# Patient Record
Sex: MALE | Race: WHITE
[De-identification: names, ages, dates, MRNs, and addresses within clinical notes are randomized per-mention and may not be internally consistent; named-entity substitution may affect disease eponyms.]

---

## 2018-05-28 ENCOUNTER — HOSPITAL ENCOUNTER (OUTPATIENT)
Dept: HOSPITAL 88 - ER | Age: 48
Setting detail: OBSERVATION
LOS: 2 days | Discharge: HOME | End: 2018-05-30
Attending: INTERNAL MEDICINE | Admitting: INTERNAL MEDICINE
Payer: COMMERCIAL

## 2018-05-28 VITALS — HEIGHT: 75 IN | WEIGHT: 261.01 LBS | BODY MASS INDEX: 32.45 KG/M2

## 2018-05-28 VITALS — DIASTOLIC BLOOD PRESSURE: 92 MMHG | SYSTOLIC BLOOD PRESSURE: 136 MMHG

## 2018-05-28 VITALS — SYSTOLIC BLOOD PRESSURE: 156 MMHG | DIASTOLIC BLOOD PRESSURE: 97 MMHG

## 2018-05-28 VITALS — DIASTOLIC BLOOD PRESSURE: 87 MMHG | SYSTOLIC BLOOD PRESSURE: 139 MMHG

## 2018-05-28 VITALS — DIASTOLIC BLOOD PRESSURE: 97 MMHG | SYSTOLIC BLOOD PRESSURE: 156 MMHG

## 2018-05-28 VITALS — DIASTOLIC BLOOD PRESSURE: 99 MMHG | SYSTOLIC BLOOD PRESSURE: 156 MMHG

## 2018-05-28 DIAGNOSIS — C79.31: ICD-10-CM

## 2018-05-28 DIAGNOSIS — R51: ICD-10-CM

## 2018-05-28 DIAGNOSIS — E86.0: Primary | ICD-10-CM

## 2018-05-28 DIAGNOSIS — E11.9: ICD-10-CM

## 2018-05-28 DIAGNOSIS — J32.9: ICD-10-CM

## 2018-05-28 DIAGNOSIS — R11.2: ICD-10-CM

## 2018-05-28 DIAGNOSIS — C31.9: ICD-10-CM

## 2018-05-28 LAB
ALBUMIN SERPL-MCNC: 3.8 G/DL (ref 3.5–5)
ALBUMIN/GLOB SERPL: 1 {RATIO} (ref 0.8–2)
ALP SERPL-CCNC: 79 IU/L (ref 40–150)
ALT SERPL-CCNC: 34 IU/L (ref 0–55)
ANION GAP SERPL CALC-SCNC: 21.7 MMOL/L (ref 8–16)
BACTERIA URNS QL MICRO: (no result) /HPF
BASOPHILS # BLD AUTO: 0.1 10*3/UL (ref 0–0.1)
BASOPHILS NFR BLD AUTO: 0.5 % (ref 0–1)
BILIRUB UR QL: (no result)
BUN SERPL-MCNC: 10 MG/DL (ref 7–26)
BUN/CREAT SERPL: 10 (ref 6–25)
CALCIUM SERPL-MCNC: 9.9 MG/DL (ref 8.4–10.2)
CHLORIDE SERPL-SCNC: 96 MMOL/L (ref 98–107)
CLARITY UR: CLEAR
CO2 SERPL-SCNC: 20 MMOL/L (ref 22–29)
COLOR UR: YELLOW
DEPRECATED NEUTROPHILS # BLD AUTO: 6 10*3/UL (ref 2.1–6.9)
DEPRECATED RBC URNS MANUAL-ACNC: (no result) /HPF (ref 0–5)
EGFRCR SERPLBLD CKD-EPI 2021: > 60 ML/MIN (ref 60–?)
EOSINOPHIL # BLD AUTO: 0.1 10*3/UL (ref 0–0.4)
EOSINOPHIL NFR BLD AUTO: 0.5 % (ref 0–6)
EPI CELLS URNS QL MICRO: (no result) /LPF
ERYTHROCYTE [DISTWIDTH] IN CORD BLOOD: 13.1 % (ref 11.7–14.4)
GLOBULIN PLAS-MCNC: 3.8 G/DL (ref 2.3–3.5)
GLUCOSE SERPLBLD-MCNC: 176 MG/DL (ref 74–118)
HCT VFR BLD AUTO: 41.8 % (ref 38.2–49.6)
HGB BLD-MCNC: 15.4 G/DL (ref 14–18)
KETONES UR QL STRIP.AUTO: (no result)
LEUKOCYTE ESTERASE UR QL STRIP.AUTO: NEGATIVE
LYMPHOCYTES # BLD: 1.4 10*3/UL (ref 1–3.2)
LYMPHOCYTES NFR BLD AUTO: 15 % (ref 18–39.1)
MCH RBC QN AUTO: 35.4 PG (ref 28–32)
MCHC RBC AUTO-ENTMCNC: 36.8 G/DL (ref 31–35)
MCV RBC AUTO: 96.1 FL (ref 81–99)
MONOCYTES # BLD AUTO: 1.8 10*3/UL (ref 0.2–0.8)
MONOCYTES NFR BLD AUTO: 19.1 % (ref 4.4–11.3)
NEUTS SEG NFR BLD AUTO: 63.6 % (ref 38.7–80)
NITRITE UR QL STRIP.AUTO: NEGATIVE
PLATELET # BLD AUTO: 231 X10E3/UL (ref 140–360)
POTASSIUM SERPL-SCNC: 3.7 MMOL/L (ref 3.5–5.1)
PROT UR QL STRIP.AUTO: NEGATIVE
RBC # BLD AUTO: 4.35 X10E6/UL (ref 4.3–5.7)
SODIUM SERPL-SCNC: 134 MMOL/L (ref 136–145)
SP GR UR STRIP: 1.02 (ref 1.01–1.02)
UROBILINOGEN UR STRIP-MCNC: 0.2 MG/DL (ref 0.2–1)
WBC #/AREA URNS HPF: (no result) /HPF (ref 0–5)

## 2018-05-28 PROCEDURE — 83735 ASSAY OF MAGNESIUM: CPT

## 2018-05-28 PROCEDURE — 80053 COMPREHEN METABOLIC PANEL: CPT

## 2018-05-28 PROCEDURE — 82948 REAGENT STRIP/BLOOD GLUCOSE: CPT

## 2018-05-28 PROCEDURE — 96374 THER/PROPH/DIAG INJ IV PUSH: CPT

## 2018-05-28 PROCEDURE — 84443 ASSAY THYROID STIM HORMONE: CPT

## 2018-05-28 PROCEDURE — 36415 COLL VENOUS BLD VENIPUNCTURE: CPT

## 2018-05-28 PROCEDURE — 80048 BASIC METABOLIC PNL TOTAL CA: CPT

## 2018-05-28 PROCEDURE — 84439 ASSAY OF FREE THYROXINE: CPT

## 2018-05-28 PROCEDURE — 85025 COMPLETE CBC W/AUTO DIFF WBC: CPT

## 2018-05-28 PROCEDURE — 99284 EMERGENCY DEPT VISIT MOD MDM: CPT

## 2018-05-28 RX ADMIN — DEXAMETHASONE SODIUM PHOSPHATE SCH MG: 4 INJECTION, SOLUTION INTRAMUSCULAR; INTRAVENOUS at 14:30

## 2018-05-28 RX ADMIN — INSULIN HUMAN SCH UNIT: 100 INJECTION, SOLUTION PARENTERAL at 18:10

## 2018-05-28 RX ADMIN — INSULIN HUMAN SCH UNIT: 100 INJECTION, SOLUTION PARENTERAL at 12:01

## 2018-05-28 RX ADMIN — INSULIN HUMAN SCH UNIT: 100 INJECTION, SOLUTION PARENTERAL at 07:59

## 2018-05-28 RX ADMIN — DEXAMETHASONE SODIUM PHOSPHATE SCH MG: 4 INJECTION, SOLUTION INTRAMUSCULAR; INTRAVENOUS at 22:26

## 2018-05-28 RX ADMIN — FAMOTIDINE SCH MG: 20 TABLET, FILM COATED ORAL at 17:48

## 2018-05-28 RX ADMIN — SODIUM CHLORIDE SCH MLS/HR: 9 INJECTION, SOLUTION INTRAVENOUS at 06:17

## 2018-05-28 RX ADMIN — HYDROCODONE BITARTRATE AND ACETAMINOPHEN PRN EA: 7.5; 325 TABLET ORAL at 18:11

## 2018-05-28 RX ADMIN — INSULIN HUMAN SCH UNIT: 100 INJECTION, SOLUTION PARENTERAL at 21:00

## 2018-05-28 RX ADMIN — METOCLOPRAMIDE SCH MG: 10 TABLET ORAL at 09:00

## 2018-05-28 RX ADMIN — HYDROCODONE BITARTRATE AND ACETAMINOPHEN PRN EA: 7.5; 325 TABLET ORAL at 11:04

## 2018-05-28 RX ADMIN — TAZOBACTAM SODIUM AND PIPERACILLIN SODIUM SCH MLS/HR: 375; 3 INJECTION, SOLUTION INTRAVENOUS at 14:30

## 2018-05-28 RX ADMIN — METOCLOPRAMIDE SCH MG: 10 TABLET ORAL at 10:57

## 2018-05-28 RX ADMIN — SODIUM CHLORIDE SCH MLS/HR: 9 INJECTION, SOLUTION INTRAVENOUS at 12:06

## 2018-05-28 RX ADMIN — TAZOBACTAM SODIUM AND PIPERACILLIN SODIUM SCH MLS/HR: 375; 3 INJECTION, SOLUTION INTRAVENOUS at 20:30

## 2018-05-28 RX ADMIN — METOCLOPRAMIDE SCH MG: 10 TABLET ORAL at 20:30

## 2018-05-28 RX ADMIN — HYDROCODONE BITARTRATE AND ACETAMINOPHEN PRN EA: 7.5; 325 TABLET ORAL at 22:26

## 2018-05-28 RX ADMIN — DEXAMETHASONE SODIUM PHOSPHATE SCH MG: 4 INJECTION, SOLUTION INTRAMUSCULAR; INTRAVENOUS at 06:17

## 2018-05-28 RX ADMIN — METOCLOPRAMIDE SCH MG: 10 TABLET ORAL at 17:48

## 2018-05-28 NOTE — HISTORY AND PHYSICAL
PRIMARY CARE PROVIDER:  Dr. Titi Wynne, following him for sinus cancer.



CHIEF COMPLAINT:  Recalcitrant nausea, vomiting and dehydration.



HISTORY OF PRESENT ILLNESS:  Mr. Byrd is a 47-year-old gentleman who had 

his last chemo for sinus cancer about 2 weeks ago.  He has had recalcitrant 

nausea and vomiting for 10 days and unable to keep anything down, becoming 

dehydrated.



REVIEW OF SYSTEMS:  He denies fever, chills or weight loss.  He has sinus 

congestion.  He denies sore throat.  He denies chest pain or palpitations.  

He denies shortness of breath, wheezing or cough.  He denies abdominal 

pain, but he does have nausea and vomiting.  He denies diarrhea, melena, 

hematemesis or hematochezia.  He denies dysuria or flank pain.  He denies 

rash or pruritus.  He denies bleeding or bruising.  He has a headache.  He 

denies vertigo.  He denies syncope or focal weakness.  He denies 

depression, agitation, homicidal or suicidal ideation.



PAST MEDICAL HISTORY:  Significant for longstanding, type-2 diabetes.  He 

apparently does not take any medication for that.  Sinus cancer diagnosed 

in 2013.  He had extensive resection at that time followed by radiation and 

chemotherapy and was in remission for a while.  It has recurred in the last 

year.  He now has stage 4 with local recurrence as well as what appears to 

be a new brain met.  



MEDICATIONS:  He lists no home medications.  



SURGICAL HISTORY:  Aside from the sinus surgery, many years ago he had 

orthopedic surgery on both ankles for fracture.  



ALLERGIES:  HE HAS NO KNOWN DRUG ALLERGIES.



FAMILY HISTORY:  Unremarkable.



SOCIAL HISTORY:  The patient is .  English is his primary 

language.  He does not smoke, drink or use illegal drugs.  He is generally 

independently functioning.



PHYSICAL EXAMINATION 

PSYCHIATRIC:  He is alert and oriented times 3 with normal mood and affect. 



CONSTITUTIONAL:  He has a normal body habitus.  He is in no acute distress. 



VITAL SIGNS:  Blood pressure 139/87.  Pulse 100 and regular.  Respiratory 

rate 16.  O2 sat 94%.  Temperature 96.4.

HEENT:  Head is atraumatic.  His eyes are anicteric.  His oropharynx is 

clear. 

NECK:  Supple with no mass or thyromegaly. 

LYMPHATIC SYSTEM:  He has no palpable cervical, axillary or inguinal 

adenopathy. 

CARDIOVASCULAR:  His heart has regular rate and rhythm without murmur or 

extra heart sound.  He has no carotid bruit.  He has no peripheral edema.  

He has palpable dorsal pedal pulses.  

RESPIRATORY:  Lungs are clear to auscultation and percussion with normal 

respiratory effort. 

GASTROINTESTINAL:  Abdomen is soft without organomegaly, masses or 

tenderness.  He has normal bowel sounds present. 

CUTANEOUS:  His skin is warm and dry to touch.  No rash or skin breakdown. 

MUSCULOSKELETAL:  His joints are in normal alignment without erythema or 

swelling.  He has no calf tenderness.  

NEUROLOGIC:  Exam is nonfocal.  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  

He is otherwise nonfocal with intact cranial nerves and no motor or sensory 

deficits. 



DIAGNOSTIC STUDIES:  MRI scan of the brain shows extensive changes in the 

sinuses consistent with old surgery, possible recurrent tumor, cannot rule 

out sinus infection.  Also shows a right temporal lobe metastasis with some 

surrounding vasogenic edema.  His UA is clear.  Chemistry shows normal 

electrolytes.  CO2 is 20.  Creatinine 1.02 and BUN 10 for a normal GFR.  

Glucose is 176.  Calcium 9.9.  His transaminases, bilirubin and alk phos 

are normal.  CBC shows a white count 9.37 with a normal differential.  

Hemoglobin 15.4, hematocrit 41.8 and platelet count 231,000.



IMPRESSION AND PLAN

1. Recalcitrant nausea and vomiting with dehydration.  The patient has 

received about 3 liters of IV fluids.  He is starting to feel better.  

The patient was placed on Pepcid and Reglan.  His nausea has improved, 

and he is started on a clear liquid diet.  Will advance as tolerated.

2. Sinus cancer, stage IV.  The patient is being followed by oncology.  

They have been consulted.  Will defer to them. 

3. What appears to be a new brain metastasis.  The patient has been 

started on dexamethasone 4 mg q.8 h. and, again, oncology has been 

consulted.

4. Sinusitis.  The patient has been started on IV Zosyn empirically.

5. Type-2 diabetes.  The patient has been started on Levemir and sliding 

scale insulin.

6. For prophylaxis, the patient will be on Pepcid for GI prophylaxis and 

SCDs for DVT prophylaxis.  No anticoagulation due to the brain met.

 







DD:  05/28/2018 14:17

DT:  05/28/2018 14:56

Job#:  [id number]

## 2018-05-28 NOTE — DIAGNOSTIC IMAGING REPORT
Exam: Brain MRI without and with IV contrast

History: Evaluate for metastases response to chemotherapy, sinus cancer

Comparison studies: None



Technique:

Precontrast sagittal and axial T2 FS, axial T1 FLAIR and axial T2*GRE. Post

contrast axial T2 FLAIR and axial, coronal and sagittal T1 FS.

Intravenous contrast: 20 cc MultiHance.



Findings:



Per reported history, patient is with history of sinus cancer. The patient

appears to have undergone previous endoscopic sinonasal surgery with bilateral

medial antrostomies, partial ethmoidectomies bilateral middle turbinate

reduction possibly bilateral sphenoid sinusotomies. There is reactive

nonspecific inflammatory mucosal thickening throughout the ethmoids, maxillary

sinuses and sphenoid sinuses with bilateral maxillary sinus retention cysts and

secretions throughout the ethmoids and sphenoid sinuses. There is enhancing

soft tissue which presumably reflects tumor within the ethmoid cavity extend to

the sphenoid cavity, along the cribriform plate, planum sphenoidale and sella

which presumably reflects tumor. Enhancing tissue/presumed tumor extends into

the cavernous sinuses bilaterally and into the sella. The pituitary

infundibulum is also thickened. Soft tissue local extends into the extraconal

space and orbit posteriorly on the left where it infiltrates the extraocular

muscles near the orbital apex and encases the left orbital nerve at the orbital

apex and within its intracanalicular segment. The left intracanalicular and

intracranial optic nerve is enhancing which may be reactive due to left optic

neuropathy. Please note, cannot adequately differentiate tumor from infection

in this context.



A 2.2 x 1.9 x 2.5 cm (SI x AP x TV) enhancing lesion with central arthrosis in

the right temporal lobe is with moderate surrounding edema. Mass effect remains

local with only mild effacement on the temporal horn of the right lateral

ventricle. There are additional similar smaller enhancing lesions with

surrounding vasogenic edema along the bilateral gyri recti and olfactory gyri

of the anterior inferior frontal lobes. The largest of these lesions measures

0.9 x 1.7 x 0.6 cm (SI x AP x TV) along the right gyrus rectus.  These lesions

are without associated restricted diffusion. Though these lesions may reflect

direct extension of metastatic disease through the right cavernous

sinus/sinonasal cavity and anterior cranial fossa respectively, infection with

nonpyrogenic abscesses and cerebritis related to superimposed sinusitis cannot

be differentiated.



There is reactive dural enhancement along the medial temporal convexities along

the cavernous sinuses as well as along the left inferior frontal and

anterolateral left temporal convexity. Enhancement within the left sphenoid

wing may reflect metastasis in the clinical setting of sinonasal cancer.

Enhancement in the basisphenoid of the clivus with associated retroclival dural

enhancement may also reflect metastatic disease, though again difficult to

differentiate from infection.



No acute ischemia. A nonspecific 3 focus of increased susceptibility/decreased

signal on T2*GRE sequence in the right putamen may reflect dystrophic

mineralization or possibly small hemorrhage.



The right temporal horn is partially effaced as described above. The remaining

lateral, third and fourth ventricles are mildly dilated without evidence of

acute hydrocephalus.



Suprasellar region: No abnormalities.

Craniocervical junction: Patent foramen magnum.  No Chiari one malformation.

Vessels: Normal flow-voids in the arteries and sinuses.

Incidental findings: Nonspecific reactive T2 hyperintense changes in the

mastoids.



IMPRESSION:  



1.  Changes of sinonasal surgery with nonspecific reactive changes throughout

the paranasal sinuses and ill-defined enhancing sinonasal mass/presumed tumor

which infiltrates the floor of the anterior cranial fossa, left orbit, central

skull base, sella, pituitary infundibulum and bilateral cavernous sinuses as

described. Moreover, sinonasal/skull base infection cannot be differentiated

from tumor in this context.



2.  Enhancing lesions in the right temporal lobe and anterior inferior frontal

lobes with surrounding vasogenic edema may reflect metastatic disease.

Moreover, superimposed infection with nonpyogenic abscesses, meningitis and

cerebritis could have this appearance. Mass effect remains local. No

herniation.



3.  Mild ventriculomegaly. No acute hydrocephalus.





Clinical correlation is recommended. Comparison with any prior available

outside imaging may also be helpful.



Signed by: Dr. Misael Lima M.D. on 5/28/2018 11:56 AM

## 2018-05-29 VITALS — SYSTOLIC BLOOD PRESSURE: 159 MMHG | DIASTOLIC BLOOD PRESSURE: 60 MMHG

## 2018-05-29 VITALS — SYSTOLIC BLOOD PRESSURE: 144 MMHG | DIASTOLIC BLOOD PRESSURE: 89 MMHG

## 2018-05-29 VITALS — SYSTOLIC BLOOD PRESSURE: 131 MMHG | DIASTOLIC BLOOD PRESSURE: 97 MMHG

## 2018-05-29 VITALS — DIASTOLIC BLOOD PRESSURE: 86 MMHG | SYSTOLIC BLOOD PRESSURE: 133 MMHG

## 2018-05-29 VITALS — DIASTOLIC BLOOD PRESSURE: 85 MMHG | SYSTOLIC BLOOD PRESSURE: 126 MMHG

## 2018-05-29 VITALS — SYSTOLIC BLOOD PRESSURE: 124 MMHG | DIASTOLIC BLOOD PRESSURE: 85 MMHG

## 2018-05-29 VITALS — SYSTOLIC BLOOD PRESSURE: 146 MMHG | DIASTOLIC BLOOD PRESSURE: 96 MMHG

## 2018-05-29 LAB
ALBUMIN SERPL-MCNC: 3.6 G/DL (ref 3.5–5)
ALBUMIN/GLOB SERPL: 1.1 {RATIO} (ref 0.8–2)
ALP SERPL-CCNC: 66 IU/L (ref 40–150)
ALT SERPL-CCNC: 24 IU/L (ref 0–55)
ANION GAP SERPL CALC-SCNC: 14 MMOL/L (ref 8–16)
BASOPHILS # BLD AUTO: 0 10*3/UL (ref 0–0.1)
BASOPHILS NFR BLD AUTO: 0.3 % (ref 0–1)
BUN SERPL-MCNC: 11 MG/DL (ref 7–26)
BUN/CREAT SERPL: 10 (ref 6–25)
CALCIUM SERPL-MCNC: 9.6 MG/DL (ref 8.4–10.2)
CHLORIDE SERPL-SCNC: 104 MMOL/L (ref 98–107)
CO2 SERPL-SCNC: 23 MMOL/L (ref 22–29)
DEPRECATED NEUTROPHILS # BLD AUTO: 6.9 10*3/UL (ref 2.1–6.9)
EGFRCR SERPLBLD CKD-EPI 2021: > 60 ML/MIN (ref 60–?)
EOSINOPHIL # BLD AUTO: 0 10*3/UL (ref 0–0.4)
EOSINOPHIL NFR BLD AUTO: 0 % (ref 0–6)
ERYTHROCYTE [DISTWIDTH] IN CORD BLOOD: 13 % (ref 11.7–14.4)
GLOBULIN PLAS-MCNC: 3.3 G/DL (ref 2.3–3.5)
GLUCOSE SERPLBLD-MCNC: 218 MG/DL (ref 74–118)
HCT VFR BLD AUTO: 36.1 % (ref 38.2–49.6)
HGB BLD-MCNC: 13.3 G/DL (ref 14–18)
LYMPHOCYTES # BLD: 0.3 10*3/UL (ref 1–3.2)
LYMPHOCYTES NFR BLD AUTO: 4.2 % (ref 18–39.1)
MCH RBC QN AUTO: 35.6 PG (ref 28–32)
MCHC RBC AUTO-ENTMCNC: 36.8 G/DL (ref 31–35)
MCV RBC AUTO: 96.5 FL (ref 81–99)
MONOCYTES # BLD AUTO: 0.6 10*3/UL (ref 0.2–0.8)
MONOCYTES NFR BLD AUTO: 8.1 % (ref 4.4–11.3)
NEUTS SEG NFR BLD AUTO: 86.9 % (ref 38.7–80)
PLATELET # BLD AUTO: 201 X10E3/UL (ref 140–360)
POTASSIUM SERPL-SCNC: 4 MMOL/L (ref 3.5–5.1)
RBC # BLD AUTO: 3.74 X10E6/UL (ref 4.3–5.7)
SODIUM SERPL-SCNC: 137 MMOL/L (ref 136–145)
TSH SERPL DL<=0.005 MIU/L-ACNC: 0 UIU/ML (ref 0.35–4.94)

## 2018-05-29 RX ADMIN — DEXAMETHASONE SODIUM PHOSPHATE SCH MG: 4 INJECTION, SOLUTION INTRAMUSCULAR; INTRAVENOUS at 21:07

## 2018-05-29 RX ADMIN — INSULIN HUMAN SCH UNIT: 100 INJECTION, SOLUTION PARENTERAL at 11:30

## 2018-05-29 RX ADMIN — TAZOBACTAM SODIUM AND PIPERACILLIN SODIUM SCH MLS/HR: 375; 3 INJECTION, SOLUTION INTRAVENOUS at 20:53

## 2018-05-29 RX ADMIN — METOCLOPRAMIDE SCH MG: 10 TABLET ORAL at 08:12

## 2018-05-29 RX ADMIN — DEXAMETHASONE SODIUM PHOSPHATE SCH MG: 4 INJECTION, SOLUTION INTRAMUSCULAR; INTRAVENOUS at 13:33

## 2018-05-29 RX ADMIN — TAZOBACTAM SODIUM AND PIPERACILLIN SODIUM SCH MLS/HR: 375; 3 INJECTION, SOLUTION INTRAVENOUS at 02:16

## 2018-05-29 RX ADMIN — METOCLOPRAMIDE SCH MG: 10 TABLET ORAL at 12:15

## 2018-05-29 RX ADMIN — TAZOBACTAM SODIUM AND PIPERACILLIN SODIUM SCH MLS/HR: 375; 3 INJECTION, SOLUTION INTRAVENOUS at 08:12

## 2018-05-29 RX ADMIN — DEXAMETHASONE SODIUM PHOSPHATE SCH MG: 4 INJECTION, SOLUTION INTRAMUSCULAR; INTRAVENOUS at 05:24

## 2018-05-29 RX ADMIN — INSULIN HUMAN SCH UNIT: 100 INJECTION, SOLUTION PARENTERAL at 07:30

## 2018-05-29 RX ADMIN — METOCLOPRAMIDE SCH MG: 10 TABLET ORAL at 15:44

## 2018-05-29 RX ADMIN — FAMOTIDINE SCH MG: 20 TABLET, FILM COATED ORAL at 15:43

## 2018-05-29 RX ADMIN — INSULIN HUMAN SCH UNIT: 100 INJECTION, SOLUTION PARENTERAL at 16:30

## 2018-05-29 RX ADMIN — TAZOBACTAM SODIUM AND PIPERACILLIN SODIUM SCH MLS/HR: 375; 3 INJECTION, SOLUTION INTRAVENOUS at 13:33

## 2018-05-29 RX ADMIN — METOCLOPRAMIDE SCH MG: 10 TABLET ORAL at 20:53

## 2018-05-29 RX ADMIN — INSULIN HUMAN SCH UNIT: 100 INJECTION, SOLUTION PARENTERAL at 20:56

## 2018-05-29 RX ADMIN — FAMOTIDINE SCH MG: 20 TABLET, FILM COATED ORAL at 08:12

## 2018-05-29 RX ADMIN — HYDROCODONE BITARTRATE AND ACETAMINOPHEN PRN EA: 7.5; 325 TABLET ORAL at 14:14

## 2018-05-30 VITALS — SYSTOLIC BLOOD PRESSURE: 136 MMHG | DIASTOLIC BLOOD PRESSURE: 85 MMHG

## 2018-05-30 VITALS — SYSTOLIC BLOOD PRESSURE: 133 MMHG | DIASTOLIC BLOOD PRESSURE: 83 MMHG

## 2018-05-30 VITALS — DIASTOLIC BLOOD PRESSURE: 83 MMHG | SYSTOLIC BLOOD PRESSURE: 133 MMHG

## 2018-05-30 VITALS — DIASTOLIC BLOOD PRESSURE: 88 MMHG | SYSTOLIC BLOOD PRESSURE: 136 MMHG

## 2018-05-30 LAB
ANION GAP SERPL CALC-SCNC: 13.1 MMOL/L (ref 8–16)
BASOPHILS # BLD AUTO: 0 10*3/UL (ref 0–0.1)
BASOPHILS NFR BLD AUTO: 0.1 % (ref 0–1)
BUN SERPL-MCNC: 15 MG/DL (ref 7–26)
BUN/CREAT SERPL: 13 (ref 6–25)
CALCIUM SERPL-MCNC: 9.8 MG/DL (ref 8.4–10.2)
CHLORIDE SERPL-SCNC: 103 MMOL/L (ref 98–107)
CO2 SERPL-SCNC: 25 MMOL/L (ref 22–29)
DEPRECATED NEUTROPHILS # BLD AUTO: 11.9 10*3/UL (ref 2.1–6.9)
EGFRCR SERPLBLD CKD-EPI 2021: > 60 ML/MIN (ref 60–?)
EOSINOPHIL # BLD AUTO: 0 10*3/UL (ref 0–0.4)
EOSINOPHIL NFR BLD AUTO: 0 % (ref 0–6)
ERYTHROCYTE [DISTWIDTH] IN CORD BLOOD: 13.2 % (ref 11.7–14.4)
GLUCOSE SERPLBLD-MCNC: 142 MG/DL (ref 74–118)
HCT VFR BLD AUTO: 33.3 % (ref 38.2–49.6)
HGB BLD-MCNC: 12.3 G/DL (ref 14–18)
LYMPHOCYTES # BLD: 0.5 10*3/UL (ref 1–3.2)
LYMPHOCYTES NFR BLD AUTO: 3.6 % (ref 18–39.1)
MAGNESIUM SERPL-MCNC: 1.9 MG/DL (ref 1.3–2.1)
MCH RBC QN AUTO: 35.8 PG (ref 28–32)
MCHC RBC AUTO-ENTMCNC: 36.9 G/DL (ref 31–35)
MCV RBC AUTO: 96.8 FL (ref 81–99)
MONOCYTES # BLD AUTO: 0.9 10*3/UL (ref 0.2–0.8)
MONOCYTES NFR BLD AUTO: 6.5 % (ref 4.4–11.3)
NEUTS SEG NFR BLD AUTO: 88.9 % (ref 38.7–80)
PLATELET # BLD AUTO: 198 X10E3/UL (ref 140–360)
POTASSIUM SERPL-SCNC: 4.1 MMOL/L (ref 3.5–5.1)
RBC # BLD AUTO: 3.44 X10E6/UL (ref 4.3–5.7)
SODIUM SERPL-SCNC: 137 MMOL/L (ref 136–145)
T4 FREE SERPL-MCNC: 1.31 NG/DL (ref 0.9–1.8)

## 2018-05-30 RX ADMIN — METOCLOPRAMIDE SCH MG: 10 TABLET ORAL at 08:47

## 2018-05-30 RX ADMIN — FAMOTIDINE SCH MG: 20 TABLET, FILM COATED ORAL at 08:47

## 2018-05-30 RX ADMIN — TAZOBACTAM SODIUM AND PIPERACILLIN SODIUM SCH MLS/HR: 375; 3 INJECTION, SOLUTION INTRAVENOUS at 01:41

## 2018-05-30 RX ADMIN — TAZOBACTAM SODIUM AND PIPERACILLIN SODIUM SCH MLS/HR: 375; 3 INJECTION, SOLUTION INTRAVENOUS at 08:47

## 2018-05-30 RX ADMIN — HYDROCODONE BITARTRATE AND ACETAMINOPHEN PRN EA: 7.5; 325 TABLET ORAL at 02:45

## 2018-05-30 RX ADMIN — DEXAMETHASONE SODIUM PHOSPHATE SCH MG: 4 INJECTION, SOLUTION INTRAMUSCULAR; INTRAVENOUS at 05:43

## 2018-05-30 RX ADMIN — INSULIN HUMAN SCH UNIT: 100 INJECTION, SOLUTION PARENTERAL at 07:30

## 2018-05-30 RX ADMIN — HYDROCODONE BITARTRATE AND ACETAMINOPHEN PRN EA: 7.5; 325 TABLET ORAL at 08:54

## 2018-05-30 NOTE — DISCHARGE SUMMARY
ADMISSION DIAGNOSES 

1.  Recalcitrant nausea and vomiting with dehydration.

2.  Sinus cancer, stage IV.

3.  Possible new brain mets.

4.  Sinusitis.

5.  Type 2 diabetes.



DISCHARGE DIAGNOSES 

1.  Recalcitrant nausea and vomiting with dehydration.

2.  Sinus cancer, stage IV.

3.  Possible new brain mets.

4.  Sinusitis.

5.  Type 2 diabetes.

6.  Anemia.



HISTORY:  The patient has a history of hypertension, type 2 diabetes, sinus 

cancer diagnosed in 2013.  He had an extensive resection followed by 

radiation and then chemo, and was in remission for a while.  It has 

recurred in the last year or so.  He now has stage IV with local 

recurrence, as well as what appears to be a new brain mets.



HOSPITAL COURSE:  A 47-year-old male who had his chemo for sinus cancer 

about 2 weeks ago, now has nausea and vomiting for 10 days, and unable to 

keep anything down, which is causing him to become dehydrated.  The patient 

was started on Pepcid, Reglan and IV fluids.  He is tolerating a regular 

diet prior to discharge.  Oncology was consulted for sinus cancer and brain 

mets.  He was also started on dexamethasone 4 mg q.8 h.  He will continue 

those until he follows with oncology next Tuesday.  He was started on IV 

Zosyn empirically for sinusitis, but was discharged on Augmentin for 10 

more days.  He will continue his same diabetes medications.  



On admission, MRI of the brain showed enhancing lesions in the right 

temporal lobe and anterior inferior frontal lobes with surrounding 

vasogenic edema, which may reflect metastatic disease.  WBC on discharge is 

13.33 likely due to steroids.  The patient is afebrile.  Hemoglobin 12.3, 

hematocrit 33.3.  Sodium 137, potassium 4.1, creatinine 1.13, GFR of over 

30.  The patient is tolerating diet and ready to go home.  He will follow 

up with oncology on Tuesday and primary care as needed.  



DICTATED BY MARIANNA LEMOS NP



 



 _________________________________

MIKA CARLTON MD



DD:  05/30/2018 17:35

DT:  05/30/2018 18:15

Job#:  [id number]

## 2018-06-10 ENCOUNTER — HOSPITAL ENCOUNTER (EMERGENCY)
Dept: HOSPITAL 88 - ER | Age: 48
Discharge: HOME | End: 2018-06-10
Payer: COMMERCIAL

## 2018-06-10 VITALS — DIASTOLIC BLOOD PRESSURE: 78 MMHG | SYSTOLIC BLOOD PRESSURE: 126 MMHG

## 2018-06-10 VITALS — BODY MASS INDEX: 32.58 KG/M2 | WEIGHT: 262 LBS | HEIGHT: 75 IN

## 2018-06-10 DIAGNOSIS — E11.9: ICD-10-CM

## 2018-06-10 DIAGNOSIS — R22.0: ICD-10-CM

## 2018-06-10 DIAGNOSIS — I10: ICD-10-CM

## 2018-06-10 DIAGNOSIS — G43.909: Primary | ICD-10-CM

## 2018-06-10 DIAGNOSIS — C79.31: ICD-10-CM

## 2018-06-10 LAB
ANION GAP SERPL CALC-SCNC: 12.4 MMOL/L (ref 8–16)
BASOPHILS # BLD AUTO: 0 10*3/UL (ref 0–0.1)
BASOPHILS NFR BLD AUTO: 0.1 % (ref 0–1)
BUN SERPL-MCNC: 14 MG/DL (ref 7–26)
BUN/CREAT SERPL: 18 (ref 6–25)
CALCIUM SERPL-MCNC: 9 MG/DL (ref 8.4–10.2)
CHLORIDE SERPL-SCNC: 100 MMOL/L (ref 98–107)
CO2 SERPL-SCNC: 29 MMOL/L (ref 22–29)
DEPRECATED NEUTROPHILS # BLD AUTO: 5 10*3/UL (ref 2.1–6.9)
EGFRCR SERPLBLD CKD-EPI 2021: > 60 ML/MIN (ref 60–?)
EOSINOPHIL # BLD AUTO: 0.1 10*3/UL (ref 0–0.4)
EOSINOPHIL NFR BLD AUTO: 1.5 % (ref 0–6)
ERYTHROCYTE [DISTWIDTH] IN CORD BLOOD: 14.4 % (ref 11.7–14.4)
GLUCOSE SERPLBLD-MCNC: 167 MG/DL (ref 74–118)
HCT VFR BLD AUTO: 35.4 % (ref 38.2–49.6)
HGB BLD-MCNC: 12.4 G/DL (ref 14–18)
LYMPHOCYTES # BLD: 1.5 10*3/UL (ref 1–3.2)
LYMPHOCYTES NFR BLD AUTO: 21.5 % (ref 18–39.1)
MCH RBC QN AUTO: 35.6 PG (ref 28–32)
MCHC RBC AUTO-ENTMCNC: 35 G/DL (ref 31–35)
MCV RBC AUTO: 101.7 FL (ref 81–99)
MONOCYTES # BLD AUTO: 0.1 10*3/UL (ref 0.2–0.8)
MONOCYTES NFR BLD AUTO: 0.7 % (ref 4.4–11.3)
NEUTS SEG NFR BLD AUTO: 74.7 % (ref 38.7–80)
PLATELET # BLD AUTO: 94 X10E3/UL (ref 140–360)
POTASSIUM SERPL-SCNC: 4.4 MMOL/L (ref 3.5–5.1)
RBC # BLD AUTO: 3.48 X10E6/UL (ref 4.3–5.7)
SODIUM SERPL-SCNC: 137 MMOL/L (ref 136–145)

## 2018-06-10 PROCEDURE — 80048 BASIC METABOLIC PNL TOTAL CA: CPT

## 2018-06-10 PROCEDURE — 99283 EMERGENCY DEPT VISIT LOW MDM: CPT

## 2018-06-10 PROCEDURE — 36415 COLL VENOUS BLD VENIPUNCTURE: CPT

## 2018-06-10 PROCEDURE — 85025 COMPLETE CBC W/AUTO DIFF WBC: CPT

## 2018-06-10 NOTE — XMS REPORT
Patient Summary Document

 Created on: 06/10/2018



MATHEW MORAN

External Reference #: [id number]

: 1970

Sex: Male



Demographics







 Address  [address] [place] [address]

 

 Home Phone  [phone]

 

 Preferred Language  Unknown

 

 Marital Status  Unknown

 

 Judaism Affiliation  Unknown

 

 Race  Unknown

 

 Ethnic Group  Unknown





Author







 Author  [facility]

 

 Address  Unknown

 

 Phone  Unavailable







Care Team Providers







 Care Team Member Name  Role  Phone

 

 MIKA CARLTON  Unavailable  Unavailable







Problems

This patient has no known problems.



Allergies, Adverse Reactions, Alerts

This patient has no known allergies or adverse reactions.



Medications

This patient has no known medications.



Results







 Test Description  Test Time  Test Comments  Text Results  Atomic Results  
Result Comments









 MRI BRAIN WOW            [place] [address]      Patient Name: MATHEW MORAN 
  MR #: [id number]    : 1970 Age/Sex: 47/M  Acct #: [id number] Req #
: [id number]  Adm Physician: MIKA CARLTON MD    Ordered by: KATEY FONSECA MD  Report #: [id number]   Location: MED/SURG3  Room/Bed: [facility]    ____
________________________________________________________________________________
_______________    Procedure: [id number] MRI/MRI BRAIN WOW  Exam Date:          
                   Exam Time:        REPORT STATUS: Signed    Exam: Brain MRI 
without and with IV contrast   History: Evaluate for metastases response to 
chemotherapy, sinus cancer   Comparison studies: None      Technique:   
Precontrast sagittal and axial T2 FS, axial T1 FLAIR and axial T2*GRE. Post   
contrast axial T2 FLAIR and axial, coronal and sagittal T1 FS.   Intravenous 
contrast: 20 cc MultiHance.      Findings:      Per reported history, patient 
is with history of sinus cancer. The patient   appears to have undergone 
previous endoscopic sinonasal surgery with bilateral   medial antrostomies, 
partial ethmoidectomies bilateral middle turbinate   reduction possibly 
bilateral sphenoid sinusotomies. There is reactive   nonspecific inflammatory 
mucosal thickening throughout the ethmoids, maxillary   sinuses and sphenoid 
sinuses with bilateral maxillary sinus retention cysts and   secretions 
throughout the ethmoids and sphenoid sinuses. There is enhancing   soft tissue 
which presumably reflects tumor within the ethmoid cavity extend to   the 
sphenoid cavity, along the cribriform plate, planum sphenoidale and sella   
which presumably reflects tumor. Enhancing tissue/presumed tumor extends into   
the cavernous sinuses bilaterally and into the sella. The pituitary   
infundibulum is also thickened. Soft tissue local extends into the extraconal   
space and orbit posteriorly on the left where it infiltrates the extraocular   
muscles near the orbital apex and encases the left orbital nerve at the orbital
   apex and within its intracanalicular segment. The left intracanalicular and 
  intracranial optic nerve is enhancing which may be reactive due to left optic
   neuropathy. Please note, cannot adequately differentiate tumor from 
infection   in this context.      A 2.2 x 1.9 x 2.5 cm (SI x AP x TV) enhancing 
lesion with central arthrosis in   the right temporal lobe is with moderate 
surrounding edema. Mass effect remains   local with only mild effacement on the 
temporal horn of the right lateral   ventricle. There are additional similar 
smaller enhancing lesions with   surrounding vasogenic edema along the 
bilateral gyri recti and olfactory gyri   of the anterior inferior frontal 
lobes. The largest of these lesions measures   0.9 x 1.7 x 0.6 cm (SI x AP x TV
) along the right gyrus rectus.  These lesions   are without associated 
restricted diffusion. Though these lesions may reflect   direct extension of 
metastatic disease through the right cavernous   sinus/sinonasal cavity and 
anterior cranial fossa respectively, infection with   nonpyrogenic abscesses 
and cerebritis related to superimposed sinusitis cannot   be differentiated.   
   There is reactive dural enhancement along the medial temporal convexities 
along   the cavernous sinuses as well as along the left inferior frontal and   
anterolateral left temporal convexity. Enhancement within the left sphenoid   
wing may reflect metastasis in the clinical setting of sinonasal cancer.   
Enhancement in the basisphenoid of the clivus with associated retroclival dural
   enhancement may also reflect metastatic disease, though again difficult to   
differentiate from infection.      No acute ischemia. A nonspecific 3 focus of 
increased susceptibility/decreased   signal on T2*GRE sequence in the right 
putamen may reflect dystrophic   mineralization or possibly small hemorrhage.  
    The right temporal horn is partially effaced as described above. The 
remaining   lateral, third and fourth ventricles are mildly dilated without 
evidence of   acute hydrocephalus.      Suprasellar region: No abnormalities.   
Craniocervical junction: Patent foramen magnum.  No Chiari one malformation.   
Vessels: Normal flow-voids in the arteries and sinuses.   Incidental findings: 
Nonspecific reactive T2 hyperintense changes in the   mastoids.      IMPRESSION
:        1.  Changes of sinonasal surgery with nonspecific reactive changes 
throughout   the paranasal sinuses and ill-defined enhancing sinonasal mass/
presumed tumor   which infiltrates the floor of the anterior cranial fossa, 
left orbit, central   skull base, sella, pituitary infundibulum and bilateral 
cavernous sinuses as   described. Moreover, sinonasal/skull base infection 
cannot be differentiated   from tumor in this context.      2.  Enhancing 
lesions in the right temporal lobe and anterior inferior frontal   lobes with 
surrounding vasogenic edema may reflect metastatic disease.   Moreover, 
superimposed infection with nonpyogenic abscesses, meningitis and   cerebritis 
could have this appearance. Mass effect remains local. No   herniation.      3.
  Mild ventriculomegaly. No acute hydrocephalus.         Clinical correlation 
is recommended. Comparison with any prior available   outside imaging may also 
be helpful.      Signed by: Dr. Sam Lima M.D. on 2018 11:56 AM      
  Dictated By: SAM LIMA MD  Electronically Signed By: SAM LIMA MD 
on 18 1153  Transcribed By: HUA on 18 1154       COPY TO:   
KATEY FONSECA MD

## 2018-07-18 ENCOUNTER — HOSPITAL ENCOUNTER (EMERGENCY)
Dept: HOSPITAL 88 - ER | Age: 48
Discharge: HOME | End: 2018-07-18
Payer: COMMERCIAL

## 2018-07-18 VITALS — DIASTOLIC BLOOD PRESSURE: 92 MMHG | SYSTOLIC BLOOD PRESSURE: 111 MMHG

## 2018-07-18 VITALS — HEIGHT: 75 IN | BODY MASS INDEX: 34.32 KG/M2 | WEIGHT: 276 LBS

## 2018-07-18 DIAGNOSIS — R53.1: Primary | ICD-10-CM

## 2018-07-18 DIAGNOSIS — H53.9: ICD-10-CM

## 2018-07-18 DIAGNOSIS — H05.20: ICD-10-CM

## 2018-07-18 DIAGNOSIS — R11.2: ICD-10-CM

## 2018-07-18 DIAGNOSIS — C31.1: ICD-10-CM

## 2018-07-18 DIAGNOSIS — C79.31: ICD-10-CM

## 2018-07-18 LAB
ALBUMIN SERPL-MCNC: 3.8 G/DL (ref 3.5–5)
ALBUMIN/GLOB SERPL: 1.4 {RATIO} (ref 0.8–2)
ALP SERPL-CCNC: 98 IU/L (ref 40–150)
ALT SERPL-CCNC: 49 IU/L (ref 0–55)
ANION GAP SERPL CALC-SCNC: 16.9 MMOL/L (ref 8–16)
BACTERIA URNS QL MICRO: (no result) /HPF
BASOPHILS # BLD AUTO: 0.1 10*3/UL (ref 0–0.1)
BASOPHILS NFR BLD AUTO: 0.9 % (ref 0–1)
BILIRUB UR QL: NEGATIVE
BUN SERPL-MCNC: 9 MG/DL (ref 7–26)
BUN/CREAT SERPL: 11 (ref 6–25)
CALCIUM SERPL-MCNC: 9.6 MG/DL (ref 8.4–10.2)
CHLORIDE SERPL-SCNC: 101 MMOL/L (ref 98–107)
CLARITY UR: CLEAR
CO2 SERPL-SCNC: 27 MMOL/L (ref 22–29)
COLOR UR: YELLOW
DEPRECATED NEUTROPHILS # BLD AUTO: 8.1 10*3/UL (ref 2.1–6.9)
DEPRECATED PHOSPHATE SERPL-MCNC: 3.1 MG/DL (ref 2.3–4.7)
DEPRECATED RBC URNS MANUAL-ACNC: (no result) /HPF (ref 0–5)
EGFRCR SERPLBLD CKD-EPI 2021: > 60 ML/MIN (ref 60–?)
EOSINOPHIL # BLD AUTO: 0.1 10*3/UL (ref 0–0.4)
EOSINOPHIL NFR BLD AUTO: 0.7 % (ref 0–6)
EPI CELLS URNS QL MICRO: (no result) /LPF
ERYTHROCYTE [DISTWIDTH] IN CORD BLOOD: 15.7 % (ref 11.7–14.4)
GLOBULIN PLAS-MCNC: 2.7 G/DL (ref 2.3–3.5)
GLUCOSE SERPLBLD-MCNC: 170 MG/DL (ref 74–118)
HCT VFR BLD AUTO: 46.4 % (ref 38.2–49.6)
HGB BLD-MCNC: 16.5 G/DL (ref 14–18)
KETONES UR QL STRIP.AUTO: NEGATIVE
LEUKOCYTE ESTERASE UR QL STRIP.AUTO: NEGATIVE
LYMPHOCYTES # BLD: 1.3 10*3/UL (ref 1–3.2)
LYMPHOCYTES NFR BLD AUTO: 11.2 % (ref 18–39.1)
MAGNESIUM SERPL-MCNC: 1.6 MG/DL (ref 1.3–2.1)
MCH RBC QN AUTO: 35.1 PG (ref 28–32)
MCHC RBC AUTO-ENTMCNC: 35.6 G/DL (ref 31–35)
MCV RBC AUTO: 98.7 FL (ref 81–99)
MONOCYTES # BLD AUTO: 1.3 10*3/UL (ref 0.2–0.8)
MONOCYTES NFR BLD AUTO: 11.6 % (ref 4.4–11.3)
NEUTS SEG NFR BLD AUTO: 70.6 % (ref 38.7–80)
NITRITE UR QL STRIP.AUTO: NEGATIVE
PLATELET # BLD AUTO: 189 X10E3/UL (ref 140–360)
POTASSIUM SERPL-SCNC: 3.9 MMOL/L (ref 3.5–5.1)
PROT UR QL STRIP.AUTO: NEGATIVE
RBC # BLD AUTO: 4.7 X10E6/UL (ref 4.3–5.7)
SODIUM SERPL-SCNC: 141 MMOL/L (ref 136–145)
SP GR UR STRIP: 1 (ref 1.01–1.02)
TSH SERPL DL<=0.005 MIU/L-ACNC: 0 UIU/ML (ref 0.35–4.94)
UROBILINOGEN UR STRIP-MCNC: 1 MG/DL (ref 0.2–1)
WBC #/AREA URNS HPF: (no result) /HPF (ref 0–5)

## 2018-07-18 PROCEDURE — 36415 COLL VENOUS BLD VENIPUNCTURE: CPT

## 2018-07-18 PROCEDURE — 70488 CT MAXILLOFACIAL W/O & W/DYE: CPT

## 2018-07-18 PROCEDURE — 84100 ASSAY OF PHOSPHORUS: CPT

## 2018-07-18 PROCEDURE — 85025 COMPLETE CBC W/AUTO DIFF WBC: CPT

## 2018-07-18 PROCEDURE — 86850 RBC ANTIBODY SCREEN: CPT

## 2018-07-18 PROCEDURE — 83735 ASSAY OF MAGNESIUM: CPT

## 2018-07-18 PROCEDURE — 74022 RADEX COMPL AQT ABD SERIES: CPT

## 2018-07-18 PROCEDURE — 70470 CT HEAD/BRAIN W/O & W/DYE: CPT

## 2018-07-18 PROCEDURE — 84443 ASSAY THYROID STIM HORMONE: CPT

## 2018-07-18 PROCEDURE — 81001 URINALYSIS AUTO W/SCOPE: CPT

## 2018-07-18 PROCEDURE — 80053 COMPREHEN METABOLIC PANEL: CPT

## 2018-07-18 PROCEDURE — 86900 BLOOD TYPING SEROLOGIC ABO: CPT

## 2018-07-18 PROCEDURE — 99284 EMERGENCY DEPT VISIT MOD MDM: CPT

## 2018-07-18 NOTE — DIAGNOSTIC IMAGING REPORT
******** ADDENDUM #1 ********



Examination: Head CT without and with contrast.



Technique: Axial images through the head were performed without and with

intravenous contrast.

Intravenous contrast: 100 mL of Isovue 370



Signed by: Dr. Laura Garcia M.D. on 7/18/2018 11:40 AM

******** ORIGINAL REPORT ********



EXAMINATION: Head CT 



HISTORY: Severe headache with vomiting, history of sinus/brain cancer, left

facial swelling, worsening weakness for the last week

COMPARISON: Brain MRI of 5/28/2018

TECHNIQUE: Multidetector axial images were obtained without contrast from the

foramen magnum to the vertex . The images were reconstructed using brain and

bone algorithms.  Thin section brain images were reformatted into coronal and

sagittal planes.

Intravenous contrast: None. 

Image quality: Motion/streaking artifact limits the evaluation of the skull

base and posterior cranial fossa.



FINDINGS:



     Parenchyma: 

1.  Interval decrease in size, enhancement and surrounding vasogenic edema of

previously seen enhancing lesions in the bilateral medial orbito-frontal gyri

and right anterior/inferior temporal lobe. Small residual dystrophic

calcifications are seen in the bilateral subfrontal/medial orbital frontal and

right temporal pole regions, likely treatment response and/or improved

treatment-related changes.

2.  No mass or hemorrhage. No CT evidence of acute territorial vascular insult.



    

     Extra-axial spaces:No abnormal density. No extra-axial fluid collections 

     Brain volume: Normal for age. 

     Ventricles: No hydrocephalus or displacement.  

     Arteries: No density suggestive of thrombus. 

     Dural sinuses: No abnormal density. 

     Extra-axial spaces: No abnormal density. 

     Foramen magnum: No mass, Chiari malformation, or basilar invagination. 

     Sella: No obvious mass.  

     Paranasal/mastoid sinuses: Partially visualized extensive postoperative

changes seen in the sinonasal regions, with persistent nonspecific not

significantly enhancing soft tissue density rim along the

nasoethmoidal/sphenoidectomy surgical margins. 



     Skull/Scalp: Better visualized on prior MRI anterior cranial fossa skull

base changes. 



IMPRESSION:



1.  Interval decrease in size, enhancement and surrounding edema of previously

seen enhancing foci in the bilateral orbitofrontal gyri and right temporal lobe

as detailed above, no new enhancing lesions.



2.  Grossly unchanged sinonasal postoperative changes and nonspecific soft

tissue ramus described.





Signed by: Dr. Laura Garcia M.D. on 7/18/2018 11:37 AM

## 2018-07-18 NOTE — DIAGNOSTIC IMAGING REPORT
EXAMINATION: CT of the face without and with contrast



HISTORY: Severe headache with vomiting, history of sinus/brain cancer, left

facial swelling, worsening weakness for the last week.

COMPARISON: Brain MRI on 5/28/2018

TECHNIQUE: Multidetector helical axial images were acquired through the face

without and with contrast and were reconstructed in bone and soft tissue

algorithms. Images were viewed in multiplanar format. 

Intravenous Contrast: 100 mL of Isovue 370



FINDINGS:



   Bones/paranasal sinuses:

-Postoperative changes from prior endoscopic sinonasal surgery with bilateral

medial antrostomies, partial ethmoidectomies bilateral middle turbinate

reduction possibly bilateral sphenoid sinusotomies. 

-Accounting for the differences in technique not significantly changed

nonspecific and minimally enhancing nodular rim of soft tissue along the

margins of the along the right greater than left ethmoidectomy margins and

sphenoid lumpectomy margin.

-Previously described minimal intracranial extension along the cavernous

sinuses is better visualized on prior MRI. 

-Abnormal signal intensity within the clivus and anterior cranial fossa skull

base is also better visualized on prior MRI.

-Persistent fullness along the left supraclinoid region and left orbital apex

as well as superomedial extraconal extension into the left orbit.

-Persistent mild mucosal, the thickening of the bilateral maxillary sinuses.

Mildly hyperdense foci are seen along the alveolar ridge bilaterally.





   Facial soft tissues:  No focal swelling or fluid collections.. 



   Paranasal sinuses and drainage pathways: As above.



   Orbits contents: As above 

   

   Nasal septum: Left-sided effusion



   Dentition: No acute abnormality of the visualized teeth.





IMPRESSION:



1.  No facial soft tissue swelling or fluid collections.



2.  Accounting for the differences in technique there has not been significant

interval change in previously seen sinonasal postoperative changes and

nonspecific mildly enhancing rim of soft tissue mostly at the ethmoidectomy

surgical margins when compared to brain MRI on 5/28/2018.









Signed by: Dr. Laura Garcia M.D. on 7/18/2018 11:50 AM

## 2018-07-18 NOTE — DIAGNOSTIC IMAGING REPORT
PROCEDURE:ABDOMEN ACUTE SERIES W/PA CXR

COMPARISON:None.

INDICATIONS:SINUS/BRAIN CANCER, INCREASED WEAKNESS OVER THE LAST WEEK

 

FINDINGS:

 

CHEST: Left subclavian central venous port catheter tip projects over 

the expected region of the superior cavoatrial junction. Lungs are 

well-expanded and without consolidation, pleural effusion, or 

pneumothorax.

 

 

 

BOWEL PATTERN: No dilated, air-filled loops of bowel. Gas and fecal 

material are noted throughout the large bowel.

 

SOFT TISSUES: Excreted intravenous contrast material opacifies the 

upper collecting systems, ureters, and urinary bladder.

 

BONES: Degenerative disc changes of the lumbar spine. No osseous 

destructive lesions.

 

CONCLUSION:

1. No acute thoracic abnormality.

 

2. Nonobstructive bowel gas pattern. 

 

Dictated by:  Misael Hidalgo M.D. on 7/18/2018 at 11:09     

Electronically approved by:  Misael Hidalgo M.D. on 7/18/2018 at 11:09

## 2018-08-02 ENCOUNTER — HOSPITAL ENCOUNTER (INPATIENT)
Dept: HOSPITAL 88 - ER | Age: 48
LOS: 8 days | Discharge: HOME | DRG: 871 | End: 2018-08-10
Attending: INTERNAL MEDICINE | Admitting: INTERNAL MEDICINE
Payer: COMMERCIAL

## 2018-08-02 VITALS — DIASTOLIC BLOOD PRESSURE: 67 MMHG | SYSTOLIC BLOOD PRESSURE: 100 MMHG

## 2018-08-02 VITALS — DIASTOLIC BLOOD PRESSURE: 83 MMHG | SYSTOLIC BLOOD PRESSURE: 112 MMHG

## 2018-08-02 VITALS — SYSTOLIC BLOOD PRESSURE: 78 MMHG | DIASTOLIC BLOOD PRESSURE: 45 MMHG

## 2018-08-02 VITALS — SYSTOLIC BLOOD PRESSURE: 119 MMHG | DIASTOLIC BLOOD PRESSURE: 64 MMHG

## 2018-08-02 VITALS — DIASTOLIC BLOOD PRESSURE: 70 MMHG | SYSTOLIC BLOOD PRESSURE: 105 MMHG

## 2018-08-02 VITALS — DIASTOLIC BLOOD PRESSURE: 70 MMHG | SYSTOLIC BLOOD PRESSURE: 108 MMHG

## 2018-08-02 VITALS — SYSTOLIC BLOOD PRESSURE: 121 MMHG | DIASTOLIC BLOOD PRESSURE: 81 MMHG

## 2018-08-02 VITALS — DIASTOLIC BLOOD PRESSURE: 90 MMHG | SYSTOLIC BLOOD PRESSURE: 121 MMHG

## 2018-08-02 VITALS — SYSTOLIC BLOOD PRESSURE: 99 MMHG | DIASTOLIC BLOOD PRESSURE: 76 MMHG

## 2018-08-02 VITALS — DIASTOLIC BLOOD PRESSURE: 84 MMHG | SYSTOLIC BLOOD PRESSURE: 114 MMHG

## 2018-08-02 VITALS — DIASTOLIC BLOOD PRESSURE: 93 MMHG | SYSTOLIC BLOOD PRESSURE: 122 MMHG

## 2018-08-02 VITALS — SYSTOLIC BLOOD PRESSURE: 86 MMHG | DIASTOLIC BLOOD PRESSURE: 52 MMHG

## 2018-08-02 VITALS — SYSTOLIC BLOOD PRESSURE: 118 MMHG | DIASTOLIC BLOOD PRESSURE: 80 MMHG

## 2018-08-02 VITALS — DIASTOLIC BLOOD PRESSURE: 83 MMHG | SYSTOLIC BLOOD PRESSURE: 98 MMHG

## 2018-08-02 VITALS — SYSTOLIC BLOOD PRESSURE: 130 MMHG | DIASTOLIC BLOOD PRESSURE: 67 MMHG

## 2018-08-02 VITALS — SYSTOLIC BLOOD PRESSURE: 108 MMHG | DIASTOLIC BLOOD PRESSURE: 77 MMHG

## 2018-08-02 VITALS — SYSTOLIC BLOOD PRESSURE: 114 MMHG | DIASTOLIC BLOOD PRESSURE: 83 MMHG

## 2018-08-02 VITALS — SYSTOLIC BLOOD PRESSURE: 98 MMHG | DIASTOLIC BLOOD PRESSURE: 79 MMHG

## 2018-08-02 VITALS — DIASTOLIC BLOOD PRESSURE: 82 MMHG | SYSTOLIC BLOOD PRESSURE: 121 MMHG

## 2018-08-02 VITALS — SYSTOLIC BLOOD PRESSURE: 75 MMHG | DIASTOLIC BLOOD PRESSURE: 57 MMHG

## 2018-08-02 VITALS — DIASTOLIC BLOOD PRESSURE: 75 MMHG | SYSTOLIC BLOOD PRESSURE: 88 MMHG

## 2018-08-02 VITALS — SYSTOLIC BLOOD PRESSURE: 119 MMHG | DIASTOLIC BLOOD PRESSURE: 75 MMHG

## 2018-08-02 VITALS — DIASTOLIC BLOOD PRESSURE: 46 MMHG | SYSTOLIC BLOOD PRESSURE: 90 MMHG

## 2018-08-02 VITALS — SYSTOLIC BLOOD PRESSURE: 124 MMHG | DIASTOLIC BLOOD PRESSURE: 112 MMHG

## 2018-08-02 VITALS — SYSTOLIC BLOOD PRESSURE: 111 MMHG | DIASTOLIC BLOOD PRESSURE: 82 MMHG

## 2018-08-02 VITALS — SYSTOLIC BLOOD PRESSURE: 116 MMHG | DIASTOLIC BLOOD PRESSURE: 88 MMHG

## 2018-08-02 VITALS — SYSTOLIC BLOOD PRESSURE: 110 MMHG | DIASTOLIC BLOOD PRESSURE: 73 MMHG

## 2018-08-02 VITALS — DIASTOLIC BLOOD PRESSURE: 88 MMHG | SYSTOLIC BLOOD PRESSURE: 117 MMHG

## 2018-08-02 VITALS — DIASTOLIC BLOOD PRESSURE: 91 MMHG | SYSTOLIC BLOOD PRESSURE: 125 MMHG

## 2018-08-02 VITALS — HEIGHT: 76 IN | WEIGHT: 284.19 LBS | BODY MASS INDEX: 34.61 KG/M2

## 2018-08-02 VITALS — DIASTOLIC BLOOD PRESSURE: 63 MMHG | SYSTOLIC BLOOD PRESSURE: 78 MMHG

## 2018-08-02 VITALS — DIASTOLIC BLOOD PRESSURE: 100 MMHG | SYSTOLIC BLOOD PRESSURE: 109 MMHG

## 2018-08-02 VITALS — SYSTOLIC BLOOD PRESSURE: 103 MMHG | DIASTOLIC BLOOD PRESSURE: 69 MMHG

## 2018-08-02 VITALS — DIASTOLIC BLOOD PRESSURE: 109 MMHG | SYSTOLIC BLOOD PRESSURE: 129 MMHG

## 2018-08-02 VITALS — DIASTOLIC BLOOD PRESSURE: 90 MMHG | SYSTOLIC BLOOD PRESSURE: 116 MMHG

## 2018-08-02 VITALS — SYSTOLIC BLOOD PRESSURE: 98 MMHG | DIASTOLIC BLOOD PRESSURE: 76 MMHG

## 2018-08-02 VITALS — SYSTOLIC BLOOD PRESSURE: 119 MMHG | DIASTOLIC BLOOD PRESSURE: 103 MMHG

## 2018-08-02 VITALS — DIASTOLIC BLOOD PRESSURE: 75 MMHG | SYSTOLIC BLOOD PRESSURE: 95 MMHG

## 2018-08-02 VITALS — DIASTOLIC BLOOD PRESSURE: 75 MMHG | SYSTOLIC BLOOD PRESSURE: 99 MMHG

## 2018-08-02 VITALS — SYSTOLIC BLOOD PRESSURE: 122 MMHG | DIASTOLIC BLOOD PRESSURE: 89 MMHG

## 2018-08-02 VITALS — DIASTOLIC BLOOD PRESSURE: 76 MMHG | SYSTOLIC BLOOD PRESSURE: 105 MMHG

## 2018-08-02 VITALS — SYSTOLIC BLOOD PRESSURE: 100 MMHG | DIASTOLIC BLOOD PRESSURE: 69 MMHG

## 2018-08-02 VITALS — SYSTOLIC BLOOD PRESSURE: 118 MMHG | DIASTOLIC BLOOD PRESSURE: 96 MMHG

## 2018-08-02 VITALS — DIASTOLIC BLOOD PRESSURE: 73 MMHG | SYSTOLIC BLOOD PRESSURE: 94 MMHG

## 2018-08-02 VITALS — SYSTOLIC BLOOD PRESSURE: 115 MMHG | DIASTOLIC BLOOD PRESSURE: 73 MMHG

## 2018-08-02 VITALS — SYSTOLIC BLOOD PRESSURE: 114 MMHG | DIASTOLIC BLOOD PRESSURE: 79 MMHG

## 2018-08-02 VITALS — SYSTOLIC BLOOD PRESSURE: 121 MMHG | DIASTOLIC BLOOD PRESSURE: 93 MMHG

## 2018-08-02 VITALS — SYSTOLIC BLOOD PRESSURE: 101 MMHG | DIASTOLIC BLOOD PRESSURE: 74 MMHG

## 2018-08-02 VITALS — DIASTOLIC BLOOD PRESSURE: 73 MMHG | SYSTOLIC BLOOD PRESSURE: 105 MMHG

## 2018-08-02 VITALS — DIASTOLIC BLOOD PRESSURE: 76 MMHG | SYSTOLIC BLOOD PRESSURE: 103 MMHG

## 2018-08-02 VITALS — DIASTOLIC BLOOD PRESSURE: 62 MMHG | SYSTOLIC BLOOD PRESSURE: 85 MMHG

## 2018-08-02 VITALS — SYSTOLIC BLOOD PRESSURE: 104 MMHG | DIASTOLIC BLOOD PRESSURE: 92 MMHG

## 2018-08-02 VITALS — SYSTOLIC BLOOD PRESSURE: 111 MMHG | DIASTOLIC BLOOD PRESSURE: 71 MMHG

## 2018-08-02 VITALS — SYSTOLIC BLOOD PRESSURE: 106 MMHG | DIASTOLIC BLOOD PRESSURE: 78 MMHG

## 2018-08-02 VITALS — DIASTOLIC BLOOD PRESSURE: 74 MMHG | SYSTOLIC BLOOD PRESSURE: 109 MMHG

## 2018-08-02 VITALS — DIASTOLIC BLOOD PRESSURE: 82 MMHG | SYSTOLIC BLOOD PRESSURE: 115 MMHG

## 2018-08-02 VITALS — DIASTOLIC BLOOD PRESSURE: 76 MMHG | SYSTOLIC BLOOD PRESSURE: 111 MMHG

## 2018-08-02 VITALS — SYSTOLIC BLOOD PRESSURE: 81 MMHG | DIASTOLIC BLOOD PRESSURE: 55 MMHG

## 2018-08-02 VITALS — SYSTOLIC BLOOD PRESSURE: 100 MMHG | DIASTOLIC BLOOD PRESSURE: 75 MMHG

## 2018-08-02 VITALS — DIASTOLIC BLOOD PRESSURE: 71 MMHG | SYSTOLIC BLOOD PRESSURE: 94 MMHG

## 2018-08-02 VITALS — SYSTOLIC BLOOD PRESSURE: 104 MMHG | DIASTOLIC BLOOD PRESSURE: 69 MMHG

## 2018-08-02 VITALS — DIASTOLIC BLOOD PRESSURE: 76 MMHG | SYSTOLIC BLOOD PRESSURE: 97 MMHG

## 2018-08-02 VITALS — DIASTOLIC BLOOD PRESSURE: 72 MMHG | SYSTOLIC BLOOD PRESSURE: 91 MMHG

## 2018-08-02 VITALS — DIASTOLIC BLOOD PRESSURE: 44 MMHG | SYSTOLIC BLOOD PRESSURE: 84 MMHG

## 2018-08-02 VITALS — SYSTOLIC BLOOD PRESSURE: 113 MMHG | DIASTOLIC BLOOD PRESSURE: 80 MMHG

## 2018-08-02 DIAGNOSIS — C79.31: ICD-10-CM

## 2018-08-02 DIAGNOSIS — I49.3: ICD-10-CM

## 2018-08-02 DIAGNOSIS — R41.82: ICD-10-CM

## 2018-08-02 DIAGNOSIS — E87.1: ICD-10-CM

## 2018-08-02 DIAGNOSIS — R51: ICD-10-CM

## 2018-08-02 DIAGNOSIS — T36.8X5A: ICD-10-CM

## 2018-08-02 DIAGNOSIS — E11.65: ICD-10-CM

## 2018-08-02 DIAGNOSIS — L03.213: ICD-10-CM

## 2018-08-02 DIAGNOSIS — E03.9: ICD-10-CM

## 2018-08-02 DIAGNOSIS — T38.0X5A: ICD-10-CM

## 2018-08-02 DIAGNOSIS — G93.6: ICD-10-CM

## 2018-08-02 DIAGNOSIS — N39.0: ICD-10-CM

## 2018-08-02 DIAGNOSIS — I10: ICD-10-CM

## 2018-08-02 DIAGNOSIS — Z82.49: ICD-10-CM

## 2018-08-02 DIAGNOSIS — R00.0: ICD-10-CM

## 2018-08-02 DIAGNOSIS — E83.42: ICD-10-CM

## 2018-08-02 DIAGNOSIS — N17.0: ICD-10-CM

## 2018-08-02 DIAGNOSIS — G93.41: ICD-10-CM

## 2018-08-02 DIAGNOSIS — E86.0: ICD-10-CM

## 2018-08-02 DIAGNOSIS — A41.9: Primary | ICD-10-CM

## 2018-08-02 DIAGNOSIS — C31.9: ICD-10-CM

## 2018-08-02 DIAGNOSIS — Z83.3: ICD-10-CM

## 2018-08-02 DIAGNOSIS — E78.5: ICD-10-CM

## 2018-08-02 DIAGNOSIS — E66.01: ICD-10-CM

## 2018-08-02 LAB
ALBUMIN SERPL-MCNC: 3.5 G/DL (ref 3.5–5)
ALBUMIN/GLOB SERPL: 1.2 {RATIO} (ref 0.8–2)
ALP SERPL-CCNC: 100 IU/L (ref 40–150)
ALT SERPL-CCNC: 31 IU/L (ref 0–55)
ANION GAP SERPL CALC-SCNC: 17.7 MMOL/L (ref 8–16)
ANISOCYTOSIS BLD QL SMEAR: SLIGHT
BACTERIA URNS QL MICRO: (no result) /HPF
BASE EXCESS BLDA CALC-SCNC: 2 MMOL/L (ref -2–3)
BASOPHILS # BLD AUTO: 0.1 10*3/UL (ref 0–0.1)
BASOPHILS NFR BLD AUTO: 0.6 % (ref 0–1)
BILIRUB UR QL: NEGATIVE
BNP BLD-MCNC: 47.1 PG/ML (ref 0–100)
BUN SERPL-MCNC: 5 MG/DL (ref 7–26)
BUN/CREAT SERPL: 5 (ref 6–25)
CALCIUM SERPL-MCNC: 9.6 MG/DL (ref 8.4–10.2)
CHLORIDE SERPL-SCNC: 96 MMOL/L (ref 98–107)
CK MB SERPL-MCNC: 0.7 NG/ML (ref 0–5)
CK MB SERPL-MCNC: 1 NG/ML (ref 0–5)
CK MB SERPL-MCNC: 1.1 NG/ML (ref 0–5)
CK SERPL-CCNC: 14 IU/L (ref 30–200)
CK SERPL-CCNC: 15 IU/L (ref 30–200)
CK SERPL-CCNC: 16 IU/L (ref 30–200)
CLARITY UR: CLEAR
CO2 SERPL-SCNC: 24 MMOL/L (ref 22–29)
COLOR UR: YELLOW
DEPRECATED APTT PLAS QN: 22.9 SECONDS (ref 23.8–35.5)
DEPRECATED INR PLAS: 1.08
DEPRECATED NEUTROPHILS # BLD AUTO: 11.8 10*3/UL (ref 2.1–6.9)
DEPRECATED RBC URNS MANUAL-ACNC: (no result) /HPF (ref 0–5)
EGFRCR SERPLBLD CKD-EPI 2021: > 60 ML/MIN (ref 60–?)
EOSINOPHIL # BLD AUTO: 0 10*3/UL (ref 0–0.4)
EOSINOPHIL NFR BLD AUTO: 0.2 % (ref 0–6)
EPI CELLS URNS QL MICRO: (no result) /LPF
ERYTHROCYTE [DISTWIDTH] IN CORD BLOOD: 15.5 % (ref 11.7–14.4)
GLOBULIN PLAS-MCNC: 2.9 G/DL (ref 2.3–3.5)
GLUCOSE SERPLBLD-MCNC: 492 MG/DL (ref 74–118)
HCO3 BLDA-SCNC: 26 MMOL/L (ref 23–28)
HCT VFR BLD AUTO: 43.3 % (ref 38.2–49.6)
HGB BLD-MCNC: 15.2 G/DL (ref 14–18)
KETONES UR QL STRIP.AUTO: NEGATIVE
LEUKOCYTE ESTERASE UR QL STRIP.AUTO: NEGATIVE
LYMPHOCYTES # BLD: 2.3 10*3/UL (ref 1–3.2)
LYMPHOCYTES NFR BLD AUTO: 14 % (ref 18–39.1)
LYMPHOCYTES NFR BLD MANUAL: 16 % (ref 19–48)
MAGNESIUM SERPL-MCNC: 1.4 MG/DL (ref 1.3–2.1)
MCH RBC QN AUTO: 34.8 PG (ref 28–32)
MCHC RBC AUTO-ENTMCNC: 35.1 G/DL (ref 31–35)
MCV RBC AUTO: 99.1 FL (ref 81–99)
METAMYELOCYTES NFR BLD MANUAL: 1 % (ref 0–0)
MONOCYTES # BLD AUTO: 1.4 10*3/UL (ref 0.2–0.8)
MONOCYTES NFR BLD AUTO: 8.8 % (ref 4.4–11.3)
MONOCYTES NFR BLD MANUAL: 7 % (ref 3.4–9)
MYELOCYTES NFR BLD MANUAL: 2 % (ref 0–0)
NEUTS SEG NFR BLD AUTO: 72.3 % (ref 38.7–80)
NEUTS SEG NFR BLD MANUAL: 72 % (ref 40–74)
NITRITE UR QL STRIP.AUTO: NEGATIVE
PCO2 BLDA: 33 MMHG (ref 41–51)
PCO2 BLDA: 61 MMHG (ref 80–105)
PH BLDA: 7.5 [PH] (ref 7.31–7.41)
PLAT MORPH BLD: NORMAL
PLATELET # BLD AUTO: 175 X10E3/UL (ref 140–360)
PLATELET # BLD EST: ADEQUATE 10*3/UL
POIKILOCYTOSIS BLD QL SMEAR: SLIGHT
POTASSIUM SERPL-SCNC: 3.7 MMOL/L (ref 3.5–5.1)
PROT UR QL STRIP.AUTO: NEGATIVE
PROTHROMBIN TIME: 13.2 SECONDS (ref 11.9–14.5)
RBC # BLD AUTO: 4.37 X10E6/UL (ref 4.3–5.7)
RBC MORPH BLD: NORMAL
SAO2 % BLDA: 93 % (ref 95–98)
SODIUM SERPL-SCNC: 134 MMOL/L (ref 136–145)
SP GR UR STRIP: 1 (ref 1.01–1.02)
TSH SERPL DL<=0.005 MIU/L-ACNC: < 0.05 UIU/ML (ref 0.35–4.94)
UROBILINOGEN UR STRIP-MCNC: 0.2 MG/DL (ref 0.2–1)

## 2018-08-02 PROCEDURE — 85730 THROMBOPLASTIN TIME PARTIAL: CPT

## 2018-08-02 PROCEDURE — 71045 X-RAY EXAM CHEST 1 VIEW: CPT

## 2018-08-02 PROCEDURE — 84484 ASSAY OF TROPONIN QUANT: CPT

## 2018-08-02 PROCEDURE — 70487 CT MAXILLOFACIAL W/DYE: CPT

## 2018-08-02 PROCEDURE — 93005 ELECTROCARDIOGRAM TRACING: CPT

## 2018-08-02 PROCEDURE — 96367 TX/PROPH/DG ADDL SEQ IV INF: CPT

## 2018-08-02 PROCEDURE — 80185 ASSAY OF PHENYTOIN TOTAL: CPT

## 2018-08-02 PROCEDURE — 93880 EXTRACRANIAL BILAT STUDY: CPT

## 2018-08-02 PROCEDURE — 82948 REAGENT STRIP/BLOOD GLUCOSE: CPT

## 2018-08-02 PROCEDURE — 96372 THER/PROPH/DIAG INJ SC/IM: CPT

## 2018-08-02 PROCEDURE — 84100 ASSAY OF PHOSPHORUS: CPT

## 2018-08-02 PROCEDURE — 84439 ASSAY OF FREE THYROXINE: CPT

## 2018-08-02 PROCEDURE — 83880 ASSAY OF NATRIURETIC PEPTIDE: CPT

## 2018-08-02 PROCEDURE — 82805 BLOOD GASES W/O2 SATURATION: CPT

## 2018-08-02 PROCEDURE — 84479 ASSAY OF THYROID (T3 OR T4): CPT

## 2018-08-02 PROCEDURE — 82140 ASSAY OF AMMONIA: CPT

## 2018-08-02 PROCEDURE — 36569 INSJ PICC 5 YR+ W/O IMAGING: CPT

## 2018-08-02 PROCEDURE — 84443 ASSAY THYROID STIM HORMONE: CPT

## 2018-08-02 PROCEDURE — 83605 ASSAY OF LACTIC ACID: CPT

## 2018-08-02 PROCEDURE — 80202 ASSAY OF VANCOMYCIN: CPT

## 2018-08-02 PROCEDURE — 83036 HEMOGLOBIN GLYCOSYLATED A1C: CPT

## 2018-08-02 PROCEDURE — 74230 X-RAY XM SWLNG FUNCJ C+: CPT

## 2018-08-02 PROCEDURE — 95812 EEG 41-60 MINUTES: CPT

## 2018-08-02 PROCEDURE — 85610 PROTHROMBIN TIME: CPT

## 2018-08-02 PROCEDURE — 80053 COMPREHEN METABOLIC PANEL: CPT

## 2018-08-02 PROCEDURE — 74018 RADEX ABDOMEN 1 VIEW: CPT

## 2018-08-02 PROCEDURE — 85025 COMPLETE CBC W/AUTO DIFF WBC: CPT

## 2018-08-02 PROCEDURE — 84550 ASSAY OF BLOOD/URIC ACID: CPT

## 2018-08-02 PROCEDURE — 97139 UNLISTED THERAPEUTIC PX: CPT

## 2018-08-02 PROCEDURE — 80048 BASIC METABOLIC PNL TOTAL CA: CPT

## 2018-08-02 PROCEDURE — 70450 CT HEAD/BRAIN W/O DYE: CPT

## 2018-08-02 PROCEDURE — 99285 EMERGENCY DEPT VISIT HI MDM: CPT

## 2018-08-02 PROCEDURE — 87040 BLOOD CULTURE FOR BACTERIA: CPT

## 2018-08-02 PROCEDURE — 84436 ASSAY OF TOTAL THYROXINE: CPT

## 2018-08-02 PROCEDURE — 82947 ASSAY GLUCOSE BLOOD QUANT: CPT

## 2018-08-02 PROCEDURE — 81001 URINALYSIS AUTO W/SCOPE: CPT

## 2018-08-02 PROCEDURE — 86376 MICROSOMAL ANTIBODY EACH: CPT

## 2018-08-02 PROCEDURE — 82550 ASSAY OF CK (CPK): CPT

## 2018-08-02 PROCEDURE — 82306 VITAMIN D 25 HYDROXY: CPT

## 2018-08-02 PROCEDURE — 36415 COLL VENOUS BLD VENIPUNCTURE: CPT

## 2018-08-02 PROCEDURE — 87086 URINE CULTURE/COLONY COUNT: CPT

## 2018-08-02 PROCEDURE — 82553 CREATINE MB FRACTION: CPT

## 2018-08-02 PROCEDURE — 85651 RBC SED RATE NONAUTOMATED: CPT

## 2018-08-02 PROCEDURE — 93306 TTE W/DOPPLER COMPLETE: CPT

## 2018-08-02 PROCEDURE — 80061 LIPID PANEL: CPT

## 2018-08-02 PROCEDURE — 36600 WITHDRAWAL OF ARTERIAL BLOOD: CPT

## 2018-08-02 PROCEDURE — 83735 ASSAY OF MAGNESIUM: CPT

## 2018-08-02 PROCEDURE — 96375 TX/PRO/DX INJ NEW DRUG ADDON: CPT

## 2018-08-02 RX ADMIN — VANCOMYCIN HYDROCHLORIDE SCH MLS/HR: 1 INJECTION, SOLUTION INTRAVENOUS at 18:20

## 2018-08-02 RX ADMIN — METOPROLOL TARTRATE SCH MG: 25 TABLET, FILM COATED ORAL at 17:00

## 2018-08-02 RX ADMIN — INSULIN HUMAN SCH UNIT: 100 INJECTION, SOLUTION PARENTERAL at 13:03

## 2018-08-02 RX ADMIN — VANCOMYCIN HYDROCHLORIDE SCH MLS/HR: 1 INJECTION, SOLUTION INTRAVENOUS at 19:02

## 2018-08-02 RX ADMIN — GLIPIZIDE SCH MG: 5 TABLET ORAL at 09:38

## 2018-08-02 RX ADMIN — ENALAPRIL MALEATE SCH MG: 10 TABLET ORAL at 17:00

## 2018-08-02 RX ADMIN — ENALAPRIL MALEATE SCH MG: 10 TABLET ORAL at 09:38

## 2018-08-02 RX ADMIN — AMLODIPINE BESYLATE SCH MG: 10 TABLET ORAL at 09:38

## 2018-08-02 RX ADMIN — TAZOBACTAM SODIUM AND PIPERACILLIN SODIUM SCH MLS/HR: 375; 3 INJECTION, SOLUTION INTRAVENOUS at 13:42

## 2018-08-02 RX ADMIN — TAZOBACTAM SODIUM AND PIPERACILLIN SODIUM SCH MLS/HR: 375; 3 INJECTION, SOLUTION INTRAVENOUS at 09:38

## 2018-08-02 RX ADMIN — FAMOTIDINE SCH MG: 20 TABLET, FILM COATED ORAL at 18:04

## 2018-08-02 RX ADMIN — INSULIN HUMAN SCH UNIT: 100 INJECTION, SOLUTION PARENTERAL at 18:00

## 2018-08-02 RX ADMIN — TAZOBACTAM SODIUM AND PIPERACILLIN SODIUM SCH MLS/HR: 375; 3 INJECTION, SOLUTION INTRAVENOUS at 18:04

## 2018-08-02 NOTE — CONSULTATION
DATE OF CONSULTATION:  August 02, 2018 



REASON FOR CONSULTATION:  Swelling of the left eye.  



HISTORY OF PRESENT ILLNESS:  This patient said he is here because he is 

confused.  The patient is telling me he has a history of brain cancer, 

which he had for more than 16 months.  He has been seen by Dr. Wynne for 

radiation and chemotherapy.  The patient has been having a problem with his 

left eye for a while from the cancer.  He came here because he was 

confused.  He did not know where he was.  Patient is currently alert and 

oriented.  The pain in his left eye is better.  He is telling me his left 

eye always looked the same.



When he came to the hospital, he was having facial pain, left eye pain, 

swelling and some drainage from his eye.



The patient was started on antibiotic, and he is currently doing much 

better as mentioned above. 



PAST MEDICAL HISTORY:  Obesity, diabetes mellitus, hypertension, chronic 

headache, sinus cancer diagnosed in 2013 with brain mets in May 2018.  



PAST SURGICAL HISTORY:  He denies.



ALLERGIES:  NKA.



SOCIAL HISTORY:  There is no smoking, drug abuse or alcohol abuse.



FAMILY HISTORY:  Hypertension.



REVIEW OF SYSTEMS:  Presently he says he is feeling much better.  There is 

no fever, no chills.  His headache is better.  Visual changes he denies, 

but he does have a problem with his left eye.  There is pain on the left 

side of his face, and he said that is better.  He does have weakness in his 

left leg.  Review of systems otherwise:

HEENT:  As above.  

NECK:  Supple.  No pain. 

PULMONARY:  No shortness of breath or cough. 

GI:  Negative. 

:  Negative.  

SKIN:  There is no rash.  

JOINTS:  Negative.  



LABS:  White count 16.24.  Hemoglobin 15.2.  Platelets 175.  Sodium 134, 

potassium 3.7, creatinine 0.92, glucose 492.  Blood cultures pending.  



He had a CT of the brain that showed left periorbital swelling, maybe 

cellulitis, no abscess.  Sinus postsurgical changes with nonspecific 

enhancing tissue.  Face CT showed satellite lesions alongside the anterior 

skull base and the right temporal lobe.  



Chest x-ray:  No significant effusion.



PHYSICAL EXAMINATION

GENERAL:  He is currently alert and oriented, does not seem to be in acute 

distress. 

VITALS:  Stable, currently afebrile. 

HEENT:  He does not appear to be icteric.  There is protrusion of his left 

eye.  There is congestion in the conjunctivae.  

NECK:  Supple. 

CHEST:  Clear.

COR:  S1 and S2, no murmur.

ABDOMEN:  Soft.  



IMPRESSION:  Periorbital cellulitis in a patient who has sinus tumor.  

Seems to be better I think.  He is currently on vancomycin and Zosyn, 

continue with the same.  Continue IV antibiotics for the next few days.  

Can switch to oral once he is clinically better.  Would also like to apply 

local gentamicin eyedrop.  



The patient is stable from infectious disease to leave the ICU.



 



DD:  08/02/2018 12:01

DT:  08/02/2018 12:31

Job#:  [id number]

## 2018-08-02 NOTE — CONSULTATION
DATE OF CONSULTATION:  August 02, 2018 



CARDIOLOGY CONSULTATION



REQUESTING PHYSICIAN:  Dr. Jacobo



REASON FOR CONSULTATION:  Tachycardia. 



HISTORY OF PRESENT ILLNESS:  This is a pleasant 48-year-old male that 

presented with left eye pain and redness.  According to the patient, he 

started having left eye edema with drainage that he decided to come into 

the emergency room for evaluation.  He has a recent diagnosis of sinus 

cancer, has been getting chemo and has been on remission.  He also stated 

he had a history of brain cancer and has been on pain medications that have 

been helping him with his pain.  He denied any chest pain, any shortness of 

breath, any diaphoresis or dizziness.  His troponin was negative.  EKG 

showed normal sinus rhythm with no ST abnormalities.  BNP was 47.  Chest 

x-ray showed low lung volumes.



PAST MEDICAL HISTORY:  Hypertension, diabetes, sinus cancer, brain cancer, 

and obesity. 



PAST SURGICAL HISTORY:  Bilateral leg surgery, sinus surgery and 

Port-A-Cath placement.



FAMILY HISTORY:  Noncontributory.



SOCIAL HISTORY:  No smoking, no drinking.  He lives at home with mom.



MEDICATIONS:  See med list.



ALLERGIES:  SHE IS NOT ALLERGIC TO ANY MEDICATION.



REVIEW OF SYSTEMS:  Negative except those mentioned above.  He has left eye 

edema.  



PHYSICAL EXAMINATION:

VITAL SIGNS:  Temperature 99, heart rate 133, respirations 20, blood 

pressure 128/99.  Oxygen saturation 96% on room air. 

GENERAL:  He is obese, awake, alert and oriented x3.

HEENT:  Mucous membrane moist.  Left eye red and swollen. 

NECK:  Supple.  

LUNGS:  Bilateral clear to auscultation. 

CARDIOVASCULAR:  S1, S2 present. 

ABDOMEN:  Soft. 

NEUROLOGICAL:  Intact. 

EXTREMITIES:  With no edema. 



LABORATORY DATA:  Sodium 134, potassium 3.7, chloride 96, CO2 24, BUN 5, 

creatinine 0.92, glucose 492.  White blood cells 16.24, red blood cells 

4.37, hemoglobin 15.2, hematocrit 43.3, and platelets 175.



IMPRESSION

1. Sinus tachycardia.

2. Hypertension.

3. History of sinus cancer.

4. History of brain cancer.

5. Obesity.

6. Diabetes.

7. Left eye edema, possible cellulitis.



ASSESSMENT AND PLAN:  We will go ahead and get an echocardiogram to assess 

the LV and the valve function.  We will check TSH, put him on low-dose beta 

blocker.  He has been on antibiotic for the drainage from the left eye.  

Further cardiac workup pending clinical course.





Thank you for this consultation.



Dictated by Korni Harvey NP.









DD:  08/02/2018 09:14

DT:  08/02/2018 10:39

Job#:  [id number] [id number]

## 2018-08-02 NOTE — DIAGNOSTIC IMAGING REPORT
CHEST SINGLE (PORTABLE), 8/2/2018 4:47 AM



Technique: CHEST SINGLE (PORTABLE)

Comparison: 7/18/2018

Clinical history: Tachycardia



Findings:

See Impression



Impression:

1. Lines/Tubes: Stable left port near the cavoatrial junction.

2. Stable cardiomediastinal silhouette.

3. Low lung volumes with bibasilar vascular crowding/atelectasis.

4. No significant effusion. No pneumothorax.



Signed by: Dr Vivian Red MD on 8/2/2018 6:41 AM

## 2018-08-02 NOTE — DIAGNOSTIC IMAGING REPORT
Exam: Head CT without IV contrast and maxillofacial CT with IV contrast



History:  Sinus cancer with brain metastases, left eye pain

Comparison studies:  None



Technique:

Axial images were obtained from the skull base to the vertex.

Coronal and sagittal images reconstructed from the axial data.

Intravenous contrast: None



Findings:



Soft tissues and bones:



Mild left periorbital preseptal soft tissue swelling. No postseptal

inflammatory changes. No rim enhancing fluid collection to indicate abscess.



Again identified are changes of endoscopic sinus surgery with bilateral medial

antrostomies, middle turbinate reduction and ethmoidectomies.



Unchanged ill-defined heterogeneously enhancing soft tissue mass centered

within the ethmoid cavity which has eroded the lateral lamella, fovea

ethmoidalis, planum sphenoidale and sella. Mass extends laterally from the left

ethmoid cavity into the left orbit through the eroded left lamina papyracea and

posterior left orbital roof where it encases the left optic nerve sheath

complex and extraocular muscles at their proximal insertion. Ill-defined tumor

extends posteriorly from orbital apex into the left superior and inferior

orbital fissures, into the left optic canal and left cavernous sinus as well as

into the sella with infiltration of the pituitary gland. The infundibulum is

also infiltrated and thickened. The right lamina papyracea is also eroded

posteriorly and there is narrowing at the orbital apex related to tumor

extension or mass effect from adjacent sinus tumor or inflammation. There is

moderate erosion of the left anterior clinoid process and left sphenoid wing.



Brain sulci: Appropriate for age.

Ventricles: Mildly dilated ventricles, unchanged. No acute hydrocephalus.

Extra-axial spaces: Persistent enhancing tissue extends to the dura from the

eroded left sphenoid wing along the left anterior temporal convexity and along

the anterior inferior left frontal convexity without significant mass effect.



Parenchyma: 

Ill-defined partially calcified lesion in the right inferior temporal lobe with

surrounding edema as well as similar calcified lesions along the gyri recti and

olfactory gyri at the skull base are unchanged..



Sellar/suprasellar region: No abnormalities.

Craniocervical junction: Patent foramen magnum. No Chiari one malformation.



Orbits:

See soft tissues above.

Globe and lens are intact. No retrobulbar hematoma. 

No preseptal periorbital inflammatory changes. No abscess.



Paranasal sinuses:

Persistent peripheral mucosal thickening with retention cysts in the bilateral

maxillary sinuses as well as mucosal thickening in the bilateral frontal

sinuses with obstructed bilateral frontoethmoidal recesses. Medial antrostomies

are patent. Residual ethmoid air cells which are not occupied by tumor are

partially opacified.



Incidental findings: 

Mild chronic reactive changes in the left mastoids.

Atherosclerotic calcifications in the carotid siphons and left intradural

vertebral artery.





IMPRESSION:



Maxillofacial CT:

1.  Left periorbital swelling may reflect cellulitis. No postseptal

inflammatory changes or abscess.

2.  No other changes from the previous maxillofacial CT of 7/18/2018.

3.  Sinonasal postsurgical changes with nonspecific enhancing tissue which

presumably reflects tumor centered in the ethmoid cavity which has eroded the

anterior skull base, extends into the left orbit, infiltrates the pituitary

gland, infundibulum and cavernous sinus as described.

4.  Nonspecific inflammatory changes in the paranasal sinuses.



 

Head CT:

1.  No acute intracranial abnormalities.

2.  Unchanged calcified lesions along the anterior skull base and right

temporal lobe with surrounding edema and local extension of tumor to the dura

along the anteroinferior left frontal and left temporal convexities.

3.  Mild ventriculomegaly. No hydrocephalus.

4.  No changes from the previous head CT of 7/18/2018.



Signed by: Dr. Misael Lima M.D. on 8/2/2018 7:24 AM

## 2018-08-02 NOTE — CONSULTATION
DATE OF CONSULTATION:  August 02, 2018 



ATTENDING DOCTOR:  Dr. Jacobo.



Thank you. Dr. Jacobo, for this consultation.



HISTORY OF PRESENT ILLNESS:  This is a 48-year-old unfortunate gentleman 

with a past medical history including sinus tumor, status post multiple 

resection and radiation, admitted through emergency with worsening left eye 

pain, headache and facial pain.  Patient had recent onset of worsening 

headache and worsening facial swelling including the left eye.  He had been 

following ENT and radiation oncologist.  He was also told that he might 

have some metastatic brain lesion.  Patient had brain CT done which showed 

no acute intracranial abnormality.  Patient does have calcified lesion 

along the anterior skull base and the right temporal lobe with surrounding 

edema and local extension of tumor to the dura along the anterior inferior 

left frontal and left temporal convexity.  Patient had face CT which shows 

left orbital swelling, may reflect cellulitis, no nasal postsurgical 

changes.  Patient denies any seizure activity.  Patient's admission blood 

work showed leukocytosis, hyperglycemia.  Patient was on steroid.



PAST MEDICAL HISTORY:  Sinus tumor, surgical resection and radiation. 



ALLERGIES:  NKDA.



MEDICATIONS:  List reviewed.



SOCIAL HISTORY:  No current smoking, alcohol or drugs.



FAMILY HISTORY:  Reviewed, noncontributory.



REVIEW OF SYSTEMS:  A 12-point review as per the HPI.





PHYSICAL EXAMINATION

GENERAL:  Alert, awake, communicative.  

HEENT:  Patient has left eye swelling, closed.  Left facial swelling.  

NECK:  Supple.

CHEST:  Clear to auscultation.

CARDIOVASCULAR:  Regular rate and rhythm.

ABDOMEN:  Soft, nontender.

EXTREMITIES:  No edema.



LABS AND IMAGING:  Reviewed.



ASSESSMENT AND PLAN:  Patient with history of sinus tumor, status post 

surgery resection, diagnosed in 2014, required multiple surgeries and had 

one course of radiation treatment in 2014. Patient has worsening disease.  

Patient has been following with radiation oncology and with ENT.  Patient 

currently in process to go to [facility] for further management.  

Patient's recent CAT scan shows persistent stable findings with no acute 

changes.  At this point, patient's steroids can be tapered.  Continue 

comfort care.  Patient will get benefit through [facility] for further 

care. 



Will continue remaining care.  Will follow patient closely.









DD:  08/02/2018 08:52

DT:  08/02/2018 11:14

Job#:  [id number]

## 2018-08-03 VITALS — SYSTOLIC BLOOD PRESSURE: 159 MMHG | DIASTOLIC BLOOD PRESSURE: 112 MMHG

## 2018-08-03 VITALS — DIASTOLIC BLOOD PRESSURE: 78 MMHG | SYSTOLIC BLOOD PRESSURE: 115 MMHG

## 2018-08-03 VITALS — SYSTOLIC BLOOD PRESSURE: 118 MMHG | DIASTOLIC BLOOD PRESSURE: 75 MMHG

## 2018-08-03 VITALS — SYSTOLIC BLOOD PRESSURE: 160 MMHG | DIASTOLIC BLOOD PRESSURE: 107 MMHG

## 2018-08-03 VITALS — SYSTOLIC BLOOD PRESSURE: 134 MMHG | DIASTOLIC BLOOD PRESSURE: 99 MMHG

## 2018-08-03 VITALS — DIASTOLIC BLOOD PRESSURE: 110 MMHG | SYSTOLIC BLOOD PRESSURE: 162 MMHG

## 2018-08-03 VITALS — SYSTOLIC BLOOD PRESSURE: 109 MMHG | DIASTOLIC BLOOD PRESSURE: 87 MMHG

## 2018-08-03 VITALS — DIASTOLIC BLOOD PRESSURE: 89 MMHG | SYSTOLIC BLOOD PRESSURE: 137 MMHG

## 2018-08-03 VITALS — DIASTOLIC BLOOD PRESSURE: 89 MMHG | SYSTOLIC BLOOD PRESSURE: 128 MMHG

## 2018-08-03 VITALS — SYSTOLIC BLOOD PRESSURE: 115 MMHG | DIASTOLIC BLOOD PRESSURE: 77 MMHG

## 2018-08-03 VITALS — DIASTOLIC BLOOD PRESSURE: 73 MMHG | SYSTOLIC BLOOD PRESSURE: 110 MMHG

## 2018-08-03 VITALS — DIASTOLIC BLOOD PRESSURE: 91 MMHG | SYSTOLIC BLOOD PRESSURE: 136 MMHG

## 2018-08-03 VITALS — DIASTOLIC BLOOD PRESSURE: 110 MMHG | SYSTOLIC BLOOD PRESSURE: 156 MMHG

## 2018-08-03 VITALS — SYSTOLIC BLOOD PRESSURE: 165 MMHG | DIASTOLIC BLOOD PRESSURE: 112 MMHG

## 2018-08-03 VITALS — SYSTOLIC BLOOD PRESSURE: 106 MMHG | DIASTOLIC BLOOD PRESSURE: 93 MMHG

## 2018-08-03 VITALS — SYSTOLIC BLOOD PRESSURE: 135 MMHG | DIASTOLIC BLOOD PRESSURE: 107 MMHG

## 2018-08-03 VITALS — SYSTOLIC BLOOD PRESSURE: 122 MMHG | DIASTOLIC BLOOD PRESSURE: 85 MMHG

## 2018-08-03 VITALS — DIASTOLIC BLOOD PRESSURE: 82 MMHG | SYSTOLIC BLOOD PRESSURE: 116 MMHG

## 2018-08-03 VITALS — DIASTOLIC BLOOD PRESSURE: 71 MMHG | SYSTOLIC BLOOD PRESSURE: 117 MMHG

## 2018-08-03 VITALS — DIASTOLIC BLOOD PRESSURE: 101 MMHG | SYSTOLIC BLOOD PRESSURE: 127 MMHG

## 2018-08-03 VITALS — SYSTOLIC BLOOD PRESSURE: 134 MMHG | DIASTOLIC BLOOD PRESSURE: 106 MMHG

## 2018-08-03 VITALS — SYSTOLIC BLOOD PRESSURE: 118 MMHG | DIASTOLIC BLOOD PRESSURE: 84 MMHG

## 2018-08-03 VITALS — SYSTOLIC BLOOD PRESSURE: 154 MMHG | DIASTOLIC BLOOD PRESSURE: 103 MMHG

## 2018-08-03 VITALS — SYSTOLIC BLOOD PRESSURE: 160 MMHG | DIASTOLIC BLOOD PRESSURE: 109 MMHG

## 2018-08-03 VITALS — SYSTOLIC BLOOD PRESSURE: 133 MMHG | DIASTOLIC BLOOD PRESSURE: 96 MMHG

## 2018-08-03 VITALS — SYSTOLIC BLOOD PRESSURE: 135 MMHG | DIASTOLIC BLOOD PRESSURE: 103 MMHG

## 2018-08-03 VITALS — SYSTOLIC BLOOD PRESSURE: 99 MMHG | DIASTOLIC BLOOD PRESSURE: 86 MMHG

## 2018-08-03 VITALS — SYSTOLIC BLOOD PRESSURE: 118 MMHG | DIASTOLIC BLOOD PRESSURE: 101 MMHG

## 2018-08-03 VITALS — DIASTOLIC BLOOD PRESSURE: 97 MMHG | SYSTOLIC BLOOD PRESSURE: 122 MMHG

## 2018-08-03 VITALS — SYSTOLIC BLOOD PRESSURE: 135 MMHG | DIASTOLIC BLOOD PRESSURE: 102 MMHG

## 2018-08-03 VITALS — SYSTOLIC BLOOD PRESSURE: 141 MMHG | DIASTOLIC BLOOD PRESSURE: 101 MMHG

## 2018-08-03 VITALS — SYSTOLIC BLOOD PRESSURE: 147 MMHG | DIASTOLIC BLOOD PRESSURE: 103 MMHG

## 2018-08-03 VITALS — DIASTOLIC BLOOD PRESSURE: 92 MMHG | SYSTOLIC BLOOD PRESSURE: 157 MMHG

## 2018-08-03 VITALS — DIASTOLIC BLOOD PRESSURE: 86 MMHG | SYSTOLIC BLOOD PRESSURE: 129 MMHG

## 2018-08-03 VITALS — DIASTOLIC BLOOD PRESSURE: 97 MMHG | SYSTOLIC BLOOD PRESSURE: 147 MMHG

## 2018-08-03 VITALS — SYSTOLIC BLOOD PRESSURE: 145 MMHG | DIASTOLIC BLOOD PRESSURE: 97 MMHG

## 2018-08-03 VITALS — SYSTOLIC BLOOD PRESSURE: 125 MMHG | DIASTOLIC BLOOD PRESSURE: 110 MMHG

## 2018-08-03 VITALS — DIASTOLIC BLOOD PRESSURE: 103 MMHG | SYSTOLIC BLOOD PRESSURE: 130 MMHG

## 2018-08-03 VITALS — SYSTOLIC BLOOD PRESSURE: 126 MMHG | DIASTOLIC BLOOD PRESSURE: 82 MMHG

## 2018-08-03 VITALS — SYSTOLIC BLOOD PRESSURE: 163 MMHG | DIASTOLIC BLOOD PRESSURE: 110 MMHG

## 2018-08-03 VITALS — SYSTOLIC BLOOD PRESSURE: 163 MMHG | DIASTOLIC BLOOD PRESSURE: 99 MMHG

## 2018-08-03 VITALS — DIASTOLIC BLOOD PRESSURE: 102 MMHG | SYSTOLIC BLOOD PRESSURE: 155 MMHG

## 2018-08-03 LAB
ALBUMIN SERPL-MCNC: 2.8 G/DL (ref 3.5–5)
ALBUMIN/GLOB SERPL: 1.1 {RATIO} (ref 0.8–2)
ALP SERPL-CCNC: 82 IU/L (ref 40–150)
ALT SERPL-CCNC: 26 IU/L (ref 0–55)
ANION GAP SERPL CALC-SCNC: 12.5 MMOL/L (ref 8–16)
BASOPHILS # BLD AUTO: 0 10*3/UL (ref 0–0.1)
BASOPHILS NFR BLD AUTO: 0.2 % (ref 0–1)
BUN SERPL-MCNC: 8 MG/DL (ref 7–26)
BUN/CREAT SERPL: 12 (ref 6–25)
CALCIUM SERPL-MCNC: 8.7 MG/DL (ref 8.4–10.2)
CHLORIDE SERPL-SCNC: 105 MMOL/L (ref 98–107)
CHOLEST SERPL-MCNC: 124 MD/DL (ref 0–199)
CHOLEST/HDLC SERPL: 2.3 {RATIO} (ref 3.9–4.7)
CO2 SERPL-SCNC: 24 MMOL/L (ref 22–29)
DEPRECATED FTI SERPL-MCNC: 1.56 UG/DL (ref 1.4–3.8)
DEPRECATED NEUTROPHILS # BLD AUTO: 7.4 10*3/UL (ref 2.1–6.9)
EGFRCR SERPLBLD CKD-EPI 2021: > 60 ML/MIN (ref 60–?)
EOSINOPHIL # BLD AUTO: 0 10*3/UL (ref 0–0.4)
EOSINOPHIL NFR BLD AUTO: 0.1 % (ref 0–6)
ERYTHROCYTE [DISTWIDTH] IN CORD BLOOD: 15.5 % (ref 11.7–14.4)
GLOBULIN PLAS-MCNC: 2.5 G/DL (ref 2.3–3.5)
GLUCOSE SERPLBLD-MCNC: 151 MG/DL (ref 74–118)
HCT VFR BLD AUTO: 36.2 % (ref 38.2–49.6)
HDLC SERPL-MSCNC: 55 MG/DL (ref 40–60)
HGB BLD-MCNC: 12 G/DL (ref 14–18)
LDLC SERPL CALC-MCNC: 54 MG/DL (ref 60–130)
LYMPHOCYTES # BLD: 0.7 10*3/UL (ref 1–3.2)
LYMPHOCYTES NFR BLD AUTO: 7.4 % (ref 18–39.1)
MAGNESIUM SERPL-MCNC: 1.6 MG/DL (ref 1.3–2.1)
MCH RBC QN AUTO: 34.6 PG (ref 28–32)
MCHC RBC AUTO-ENTMCNC: 33.1 G/DL (ref 31–35)
MCV RBC AUTO: 104.3 FL (ref 81–99)
MONOCYTES # BLD AUTO: 0.7 10*3/UL (ref 0.2–0.8)
MONOCYTES NFR BLD AUTO: 8.1 % (ref 4.4–11.3)
NEUTS SEG NFR BLD AUTO: 81.9 % (ref 38.7–80)
PLATELET # BLD AUTO: 109 X10E3/UL (ref 140–360)
POTASSIUM SERPL-SCNC: 3.5 MMOL/L (ref 3.5–5.1)
RBC # BLD AUTO: 3.47 X10E6/UL (ref 4.3–5.7)
SODIUM SERPL-SCNC: 138 MMOL/L (ref 136–145)
T4 FREE SERPL-MCNC: 0.88 NG/DL (ref 0.9–1.8)
TRIGL SERPL-MCNC: 75 MG/DL (ref 0–149)
TSH SERPL DL<=0.005 MIU/L-ACNC: 0 UIU/ML (ref 0.35–4.94)
TSH SERPL DL<=0.005 MIU/L-ACNC: 0.01 UIU/ML (ref 0.35–4.94)

## 2018-08-03 RX ADMIN — Medication PRN MG: at 14:34

## 2018-08-03 RX ADMIN — TAZOBACTAM SODIUM AND PIPERACILLIN SODIUM SCH MLS/HR: 375; 3 INJECTION, SOLUTION INTRAVENOUS at 00:40

## 2018-08-03 RX ADMIN — VANCOMYCIN HYDROCHLORIDE SCH MLS/HR: 1 INJECTION, SOLUTION INTRAVENOUS at 09:35

## 2018-08-03 RX ADMIN — TAZOBACTAM SODIUM AND PIPERACILLIN SODIUM SCH MLS/HR: 375; 3 INJECTION, SOLUTION INTRAVENOUS at 18:24

## 2018-08-03 RX ADMIN — FAMOTIDINE SCH MG: 20 TABLET, FILM COATED ORAL at 07:30

## 2018-08-03 RX ADMIN — AMLODIPINE BESYLATE SCH MG: 10 TABLET ORAL at 09:36

## 2018-08-03 RX ADMIN — GENTAMICIN SULFATE SCH ML: 3 SOLUTION OPHTHALMIC at 21:24

## 2018-08-03 RX ADMIN — FAMOTIDINE SCH MG: 20 TABLET, FILM COATED ORAL at 16:25

## 2018-08-03 RX ADMIN — GENTAMICIN SULFATE SCH ML: 3 SOLUTION OPHTHALMIC at 13:44

## 2018-08-03 RX ADMIN — INSULIN LISPRO SCH UNIT: 100 INJECTION, SOLUTION INTRAVENOUS; SUBCUTANEOUS at 21:00

## 2018-08-03 RX ADMIN — VANCOMYCIN HYDROCHLORIDE SCH MLS/HR: 1 INJECTION, SOLUTION INTRAVENOUS at 21:24

## 2018-08-03 RX ADMIN — Medication PRN MG: at 09:36

## 2018-08-03 RX ADMIN — INSULIN LISPRO SCH UNIT: 100 INJECTION, SOLUTION INTRAVENOUS; SUBCUTANEOUS at 16:30

## 2018-08-03 RX ADMIN — Medication SCH MG: at 13:13

## 2018-08-03 RX ADMIN — TAZOBACTAM SODIUM AND PIPERACILLIN SODIUM SCH MLS/HR: 375; 3 INJECTION, SOLUTION INTRAVENOUS at 13:13

## 2018-08-03 RX ADMIN — ENALAPRIL MALEATE SCH MG: 10 TABLET ORAL at 16:26

## 2018-08-03 RX ADMIN — METOPROLOL TARTRATE SCH MG: 25 TABLET, FILM COATED ORAL at 16:26

## 2018-08-03 RX ADMIN — GLIPIZIDE SCH MG: 5 TABLET ORAL at 09:35

## 2018-08-03 RX ADMIN — ENALAPRIL MALEATE SCH MG: 10 TABLET ORAL at 09:36

## 2018-08-03 RX ADMIN — TAZOBACTAM SODIUM AND PIPERACILLIN SODIUM SCH MLS/HR: 375; 3 INJECTION, SOLUTION INTRAVENOUS at 05:21

## 2018-08-03 RX ADMIN — METOPROLOL TARTRATE SCH MG: 25 TABLET, FILM COATED ORAL at 09:35

## 2018-08-03 NOTE — PROGRESS NOTE
DATE:  August 03, 2018 



SUBJECTIVE:  Patient seen and examined today.  Patient appears much better. 

 Clinical condition is improving.  Denies any worsening event.  His blood 

glucose also went down.  Patient followed with ID, currently on antibiotics 

treatment.



PHYSICAL EXAMINATION:

GENERAL:  Alert, awake, communicative, not in acute distress.

HEENT:  Normocephalic, atraumatic.  Sclerae pale.  Conjunctivae clear.

NECK:  Supple.

CHEST:  Decreased breath sounds on bases.

CARDIOVASCULAR:  Regular rate and rhythm.

SKIN:  He has left facial cellulitis with left eye swelling.

ABDOMEN:  Soft.

EXTREMITIES:  No edema.



LABS AND IMAGING:  Reviewed.



ASSESSMENT AND PLAN:  Patient with history of sinus tumor, status post 

surgery, radiation, required multiple surgical resections in past, 

currently admitted with worsening headache.  Patient computerized axial 

tomography scan did not show any worsening disease.  Patient in process of 

follow with [facility] for further management.  At current, recommendation 

to continue current comfort care.  Will monitor patient closely.







DD:  08/03/2018 08:40

DT:  08/03/2018 08:51

Job#:  [id number]

## 2018-08-03 NOTE — CONSULTATION
DATE OF CONSULTATION:  August 03, 2018 



ENDOCRINE CONSULTATION



This is a patient of Dr. Jacobo.  Thank you very much for referring this 

patient.   



This is a 48-year-old white male gentleman who is referred to me for 

evaluation of acute hyperglycemia, diabetes mellitus type 2 and low TSH.  

Patient is a known diabetic for the last several years and takes glipizide 

5 mg twice daily.  He came to the hospital because of the swelling of the 

left eye.  Patient has stage-IV sinus cancer with periorbital cellulitis 

and possible metastasis.  Patient also has long-standing history of 

hypertension and obesity.   



PHYSICAL EXAMINATION

GENERAL:  Today, the patient is alert, awake, a little bit apprehensive.  

He has significant cellulitis of the left eye.  

VITALS:  His heart rate is around 78.  Blood pressure 130/80 mmHg. 

HEENT:  Essentially unremarkable.  Thyroid is palpable. Clinically, he is 

near euthyroid.

CHEST:  Bilateral vesicular breathing.  He has mild bronchospasm.  

CARDIAC:  First and 2nd heart sounds.  There is no 3rd or 4th heart sound.  

Ejection systolic murmur, grade 2/6.  

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 

extremities. 



LABS:  His blood sugars off the insulin drip have been in the ranges of 145 

to 161.  Hemoglobin A1c is 7.7. 



CLINICAL IMPRESSION

1. Diabetes mellitus, type 2, uncontrolled, with complications 

precipitated by steroids. 

2. Cancer of the sinuses with metastases.

3. Periorbital edema.

4. Low thyroid-stimulating hormone, rule out hypothyroidism, could be 

related to steroids.

5. Hypertension. 

PLAN:  The plan at this time is to taper off the insulin drip.  Start him 

on subcutaneous insulin.  Monitor his blood sugars closely and adjust the 

insulin dose.  



Thanks for referring this patient.  I will be following this patient with 

you.





 



DD:  08/03/2018 13:29

DT:  08/03/2018 13:58

Job#:  [id number]

## 2018-08-04 VITALS — DIASTOLIC BLOOD PRESSURE: 93 MMHG | SYSTOLIC BLOOD PRESSURE: 148 MMHG

## 2018-08-04 VITALS — DIASTOLIC BLOOD PRESSURE: 85 MMHG | SYSTOLIC BLOOD PRESSURE: 165 MMHG

## 2018-08-04 VITALS — DIASTOLIC BLOOD PRESSURE: 67 MMHG | SYSTOLIC BLOOD PRESSURE: 141 MMHG

## 2018-08-04 VITALS — SYSTOLIC BLOOD PRESSURE: 141 MMHG | DIASTOLIC BLOOD PRESSURE: 67 MMHG

## 2018-08-04 VITALS — SYSTOLIC BLOOD PRESSURE: 122 MMHG | DIASTOLIC BLOOD PRESSURE: 64 MMHG

## 2018-08-04 VITALS — SYSTOLIC BLOOD PRESSURE: 120 MMHG | DIASTOLIC BLOOD PRESSURE: 67 MMHG

## 2018-08-04 VITALS — SYSTOLIC BLOOD PRESSURE: 148 MMHG | DIASTOLIC BLOOD PRESSURE: 93 MMHG

## 2018-08-04 VITALS — SYSTOLIC BLOOD PRESSURE: 130 MMHG | DIASTOLIC BLOOD PRESSURE: 110 MMHG

## 2018-08-04 LAB
CHOLEST SERPL-MCNC: 155 MD/DL (ref 0–199)
CHOLEST/HDLC SERPL: 2.5 {RATIO} (ref 3.9–4.7)
DEPRECATED PHOSPHATE SERPL-MCNC: 2.7 MG/DL (ref 2.3–4.7)
HDLC SERPL-MSCNC: 62 MG/DL (ref 40–60)
LDLC SERPL CALC-MCNC: 71 MG/DL (ref 60–130)
MAGNESIUM SERPL-MCNC: 1.1 MG/DL (ref 1.3–2.1)
TRIGL SERPL-MCNC: 109 MG/DL (ref 0–149)

## 2018-08-04 RX ADMIN — ENALAPRIL MALEATE SCH MG: 10 TABLET ORAL at 17:52

## 2018-08-04 RX ADMIN — TAZOBACTAM SODIUM AND PIPERACILLIN SODIUM SCH MLS/HR: 375; 3 INJECTION, SOLUTION INTRAVENOUS at 05:55

## 2018-08-04 RX ADMIN — ENALAPRIL MALEATE SCH MG: 10 TABLET ORAL at 08:37

## 2018-08-04 RX ADMIN — GENTAMICIN SULFATE SCH ML: 3 SOLUTION OPHTHALMIC at 05:56

## 2018-08-04 RX ADMIN — GLIPIZIDE SCH MG: 5 TABLET ORAL at 08:36

## 2018-08-04 RX ADMIN — INSULIN LISPRO SCH UNIT: 100 INJECTION, SOLUTION INTRAVENOUS; SUBCUTANEOUS at 12:48

## 2018-08-04 RX ADMIN — INSULIN LISPRO SCH UNIT: 100 INJECTION, SOLUTION INTRAVENOUS; SUBCUTANEOUS at 12:49

## 2018-08-04 RX ADMIN — METOPROLOL TARTRATE SCH MG: 25 TABLET, FILM COATED ORAL at 08:36

## 2018-08-04 RX ADMIN — FAMOTIDINE SCH MG: 20 TABLET, FILM COATED ORAL at 17:50

## 2018-08-04 RX ADMIN — METOPROLOL TARTRATE PRN MG: 1 INJECTION, SOLUTION INTRAVENOUS at 03:35

## 2018-08-04 RX ADMIN — AMLODIPINE BESYLATE SCH MG: 10 TABLET ORAL at 08:36

## 2018-08-04 RX ADMIN — TAZOBACTAM SODIUM AND PIPERACILLIN SODIUM SCH MLS/HR: 375; 3 INJECTION, SOLUTION INTRAVENOUS at 00:30

## 2018-08-04 RX ADMIN — TAZOBACTAM SODIUM AND PIPERACILLIN SODIUM SCH MLS/HR: 375; 3 INJECTION, SOLUTION INTRAVENOUS at 12:30

## 2018-08-04 RX ADMIN — GENTAMICIN SULFATE SCH ML: 3 SOLUTION OPHTHALMIC at 14:30

## 2018-08-04 RX ADMIN — INSULIN LISPRO SCH UNIT: 100 INJECTION, SOLUTION INTRAVENOUS; SUBCUTANEOUS at 17:51

## 2018-08-04 RX ADMIN — TAZOBACTAM SODIUM AND PIPERACILLIN SODIUM SCH MLS/HR: 375; 3 INJECTION, SOLUTION INTRAVENOUS at 17:52

## 2018-08-04 RX ADMIN — VANCOMYCIN HYDROCHLORIDE SCH MLS/HR: 1 INJECTION, SOLUTION INTRAVENOUS at 21:45

## 2018-08-04 RX ADMIN — AMIODARONE HYDROCHLORIDE SCH MG: 200 TABLET ORAL at 08:36

## 2018-08-04 RX ADMIN — METOPROLOL TARTRATE SCH MG: 25 TABLET, FILM COATED ORAL at 17:52

## 2018-08-04 RX ADMIN — INSULIN LISPRO SCH UNIT: 100 INJECTION, SOLUTION INTRAVENOUS; SUBCUTANEOUS at 21:00

## 2018-08-04 RX ADMIN — FAMOTIDINE SCH MG: 20 TABLET, FILM COATED ORAL at 08:36

## 2018-08-04 RX ADMIN — INSULIN LISPRO SCH UNIT: 100 INJECTION, SOLUTION INTRAVENOUS; SUBCUTANEOUS at 09:42

## 2018-08-04 RX ADMIN — Medication SCH MG: at 09:46

## 2018-08-04 RX ADMIN — VANCOMYCIN HYDROCHLORIDE SCH MLS/HR: 1 INJECTION, SOLUTION INTRAVENOUS at 08:36

## 2018-08-04 RX ADMIN — INSULIN LISPRO SCH UNIT: 100 INJECTION, SOLUTION INTRAVENOUS; SUBCUTANEOUS at 17:52

## 2018-08-04 RX ADMIN — Medication SCH MG: at 08:36

## 2018-08-04 RX ADMIN — INSULIN LISPRO SCH UNIT: 100 INJECTION, SOLUTION INTRAVENOUS; SUBCUTANEOUS at 09:36

## 2018-08-04 RX ADMIN — GENTAMICIN SULFATE SCH ML: 3 SOLUTION OPHTHALMIC at 22:00

## 2018-08-05 VITALS — DIASTOLIC BLOOD PRESSURE: 108 MMHG | SYSTOLIC BLOOD PRESSURE: 147 MMHG

## 2018-08-05 VITALS — DIASTOLIC BLOOD PRESSURE: 78 MMHG | SYSTOLIC BLOOD PRESSURE: 100 MMHG

## 2018-08-05 VITALS — SYSTOLIC BLOOD PRESSURE: 103 MMHG | DIASTOLIC BLOOD PRESSURE: 76 MMHG

## 2018-08-05 VITALS — DIASTOLIC BLOOD PRESSURE: 104 MMHG | SYSTOLIC BLOOD PRESSURE: 133 MMHG

## 2018-08-05 VITALS — SYSTOLIC BLOOD PRESSURE: 93 MMHG | DIASTOLIC BLOOD PRESSURE: 74 MMHG

## 2018-08-05 VITALS — DIASTOLIC BLOOD PRESSURE: 93 MMHG | SYSTOLIC BLOOD PRESSURE: 131 MMHG

## 2018-08-05 LAB
ALBUMIN SERPL-MCNC: 2.9 G/DL (ref 3.5–5)
ALBUMIN/GLOB SERPL: 0.9 {RATIO} (ref 0.8–2)
ALP SERPL-CCNC: 81 IU/L (ref 40–150)
ALT SERPL-CCNC: 33 IU/L (ref 0–55)
ANION GAP SERPL CALC-SCNC: 15.5 MMOL/L (ref 8–16)
ANION GAP SERPL CALC-SCNC: 15.6 MMOL/L (ref 8–16)
BASOPHILS # BLD AUTO: 0.1 10*3/UL (ref 0–0.1)
BASOPHILS # BLD AUTO: 0.1 10*3/UL (ref 0–0.1)
BASOPHILS NFR BLD AUTO: 0.6 % (ref 0–1)
BASOPHILS NFR BLD AUTO: 0.9 % (ref 0–1)
BUN SERPL-MCNC: 6 MG/DL (ref 7–26)
BUN SERPL-MCNC: < 5 MG/DL (ref 7–26)
BUN/CREAT SERPL: 5 (ref 6–25)
BUN/CREAT SERPL: 6 (ref 6–25)
CALCIUM SERPL-MCNC: 10.1 MG/DL (ref 8.4–10.2)
CALCIUM SERPL-MCNC: 10.2 MG/DL (ref 8.4–10.2)
CHLORIDE SERPL-SCNC: 101 MMOL/L (ref 98–107)
CHLORIDE SERPL-SCNC: 99 MMOL/L (ref 98–107)
CK MB SERPL-MCNC: 0.7 NG/ML (ref 0–5)
CK SERPL-CCNC: 23 IU/L (ref 30–200)
CO2 SERPL-SCNC: 28 MMOL/L (ref 22–29)
CO2 SERPL-SCNC: 30 MMOL/L (ref 22–29)
DEPRECATED NEUTROPHILS # BLD AUTO: 7.9 10*3/UL (ref 2.1–6.9)
DEPRECATED NEUTROPHILS # BLD AUTO: 8.4 10*3/UL (ref 2.1–6.9)
EGFRCR SERPLBLD CKD-EPI 2021: > 60 ML/MIN (ref 60–?)
EGFRCR SERPLBLD CKD-EPI 2021: > 60 ML/MIN (ref 60–?)
EOSINOPHIL # BLD AUTO: 0.1 10*3/UL (ref 0–0.4)
EOSINOPHIL # BLD AUTO: 0.1 10*3/UL (ref 0–0.4)
EOSINOPHIL NFR BLD AUTO: 0.5 % (ref 0–6)
EOSINOPHIL NFR BLD AUTO: 0.8 % (ref 0–6)
ERYTHROCYTE [DISTWIDTH] IN CORD BLOOD: 15.4 % (ref 11.7–14.4)
ERYTHROCYTE [DISTWIDTH] IN CORD BLOOD: 15.4 % (ref 11.7–14.4)
GLOBULIN PLAS-MCNC: 3.2 G/DL (ref 2.3–3.5)
GLUCOSE SERPLBLD-MCNC: 199 MG/DL (ref 74–118)
GLUCOSE SERPLBLD-MCNC: 46 MG/DL (ref 74–118)
HCT VFR BLD AUTO: 41.2 % (ref 38.2–49.6)
HCT VFR BLD AUTO: 41.4 % (ref 38.2–49.6)
HGB BLD-MCNC: 14.1 G/DL (ref 14–18)
HGB BLD-MCNC: 14.2 G/DL (ref 14–18)
LYMPHOCYTES # BLD: 1.7 10*3/UL (ref 1–3.2)
LYMPHOCYTES # BLD: 1.9 10*3/UL (ref 1–3.2)
LYMPHOCYTES NFR BLD AUTO: 15.4 % (ref 18–39.1)
LYMPHOCYTES NFR BLD AUTO: 16.3 % (ref 18–39.1)
LYMPHOCYTES NFR BLD MANUAL: 14 % (ref 19–48)
MAGNESIUM SERPL-MCNC: 1.2 MG/DL (ref 1.3–2.1)
MCH RBC QN AUTO: 34.6 PG (ref 28–32)
MCH RBC QN AUTO: 35.2 PG (ref 28–32)
MCHC RBC AUTO-ENTMCNC: 34.2 G/DL (ref 31–35)
MCHC RBC AUTO-ENTMCNC: 34.3 G/DL (ref 31–35)
MCV RBC AUTO: 101 FL (ref 81–99)
MCV RBC AUTO: 102.7 FL (ref 81–99)
MONOCYTES # BLD AUTO: 0.9 10*3/UL (ref 0.2–0.8)
MONOCYTES # BLD AUTO: 1 10*3/UL (ref 0.2–0.8)
MONOCYTES NFR BLD AUTO: 7.7 % (ref 4.4–11.3)
MONOCYTES NFR BLD AUTO: 8.7 % (ref 4.4–11.3)
MONOCYTES NFR BLD MANUAL: 6 % (ref 3.4–9)
NEUTS BAND NFR BLD MANUAL: 3 %
NEUTS SEG NFR BLD AUTO: 71.8 % (ref 38.7–80)
NEUTS SEG NFR BLD AUTO: 72 % (ref 38.7–80)
NEUTS SEG NFR BLD MANUAL: 77 % (ref 40–74)
PLAT MORPH BLD: NORMAL
PLATELET # BLD AUTO: 157 X10E3/UL (ref 140–360)
PLATELET # BLD AUTO: 172 X10E3/UL (ref 140–360)
PLATELET # BLD EST: ADEQUATE 10*3/UL
POTASSIUM SERPL-SCNC: 3.5 MMOL/L (ref 3.5–5.1)
POTASSIUM SERPL-SCNC: 3.6 MMOL/L (ref 3.5–5.1)
RBC # BLD AUTO: 4.03 X10E6/UL (ref 4.3–5.7)
RBC # BLD AUTO: 4.08 X10E6/UL (ref 4.3–5.7)
RBC MORPH BLD: NORMAL
SODIUM SERPL-SCNC: 141 MMOL/L (ref 136–145)
SODIUM SERPL-SCNC: 141 MMOL/L (ref 136–145)

## 2018-08-05 RX ADMIN — VANCOMYCIN HYDROCHLORIDE SCH MLS/HR: 1 INJECTION, SOLUTION INTRAVENOUS at 22:28

## 2018-08-05 RX ADMIN — FAMOTIDINE SCH MG: 20 TABLET, FILM COATED ORAL at 08:53

## 2018-08-05 RX ADMIN — TAZOBACTAM SODIUM AND PIPERACILLIN SODIUM SCH MLS/HR: 375; 3 INJECTION, SOLUTION INTRAVENOUS at 12:16

## 2018-08-05 RX ADMIN — GENTAMICIN SULFATE SCH ML: 3 SOLUTION OPHTHALMIC at 14:00

## 2018-08-05 RX ADMIN — TAZOBACTAM SODIUM AND PIPERACILLIN SODIUM SCH MLS/HR: 375; 3 INJECTION, SOLUTION INTRAVENOUS at 00:18

## 2018-08-05 RX ADMIN — Medication SCH MG: at 08:55

## 2018-08-05 RX ADMIN — METOPROLOL TARTRATE SCH MG: 25 TABLET, FILM COATED ORAL at 08:55

## 2018-08-05 RX ADMIN — Medication SCH MG: at 08:54

## 2018-08-05 RX ADMIN — TAZOBACTAM SODIUM AND PIPERACILLIN SODIUM SCH MLS/HR: 375; 3 INJECTION, SOLUTION INTRAVENOUS at 17:23

## 2018-08-05 RX ADMIN — INSULIN LISPRO SCH UNIT: 100 INJECTION, SOLUTION INTRAVENOUS; SUBCUTANEOUS at 21:00

## 2018-08-05 RX ADMIN — GENTAMICIN SULFATE SCH ML: 3 SOLUTION OPHTHALMIC at 06:00

## 2018-08-05 RX ADMIN — DEXTROSE MONOHYDRATE SCH MLS/HR: 100 INJECTION, SOLUTION INTRAVENOUS at 19:30

## 2018-08-05 RX ADMIN — METOPROLOL TARTRATE PRN MG: 1 INJECTION, SOLUTION INTRAVENOUS at 22:14

## 2018-08-05 RX ADMIN — INSULIN LISPRO SCH UNIT: 100 INJECTION, SOLUTION INTRAVENOUS; SUBCUTANEOUS at 08:54

## 2018-08-05 RX ADMIN — VANCOMYCIN HYDROCHLORIDE SCH MLS/HR: 1 INJECTION, SOLUTION INTRAVENOUS at 08:54

## 2018-08-05 RX ADMIN — METOPROLOL TARTRATE SCH MG: 25 TABLET, FILM COATED ORAL at 17:00

## 2018-08-05 RX ADMIN — ENALAPRIL MALEATE SCH MG: 10 TABLET ORAL at 17:00

## 2018-08-05 RX ADMIN — ENALAPRIL MALEATE SCH MG: 10 TABLET ORAL at 08:55

## 2018-08-05 RX ADMIN — INSULIN LISPRO SCH UNIT: 100 INJECTION, SOLUTION INTRAVENOUS; SUBCUTANEOUS at 12:17

## 2018-08-05 RX ADMIN — FAMOTIDINE SCH MG: 20 TABLET, FILM COATED ORAL at 16:30

## 2018-08-05 RX ADMIN — AMIODARONE HYDROCHLORIDE SCH MG: 200 TABLET ORAL at 08:54

## 2018-08-05 RX ADMIN — GLIPIZIDE SCH MG: 5 TABLET ORAL at 08:54

## 2018-08-05 RX ADMIN — Medication SCH MG: at 17:22

## 2018-08-05 RX ADMIN — INSULIN LISPRO SCH UNIT: 100 INJECTION, SOLUTION INTRAVENOUS; SUBCUTANEOUS at 12:16

## 2018-08-05 RX ADMIN — GENTAMICIN SULFATE SCH ML: 3 SOLUTION OPHTHALMIC at 22:00

## 2018-08-05 RX ADMIN — TAZOBACTAM SODIUM AND PIPERACILLIN SODIUM SCH MLS/HR: 375; 3 INJECTION, SOLUTION INTRAVENOUS at 06:22

## 2018-08-05 RX ADMIN — INSULIN LISPRO SCH UNIT: 100 INJECTION, SOLUTION INTRAVENOUS; SUBCUTANEOUS at 16:30

## 2018-08-06 VITALS — DIASTOLIC BLOOD PRESSURE: 74 MMHG | SYSTOLIC BLOOD PRESSURE: 88 MMHG

## 2018-08-06 VITALS — DIASTOLIC BLOOD PRESSURE: 64 MMHG | SYSTOLIC BLOOD PRESSURE: 87 MMHG

## 2018-08-06 VITALS — SYSTOLIC BLOOD PRESSURE: 91 MMHG | DIASTOLIC BLOOD PRESSURE: 78 MMHG

## 2018-08-06 VITALS — DIASTOLIC BLOOD PRESSURE: 106 MMHG | SYSTOLIC BLOOD PRESSURE: 135 MMHG

## 2018-08-06 VITALS — DIASTOLIC BLOOD PRESSURE: 71 MMHG | SYSTOLIC BLOOD PRESSURE: 87 MMHG

## 2018-08-06 VITALS — DIASTOLIC BLOOD PRESSURE: 74 MMHG | SYSTOLIC BLOOD PRESSURE: 116 MMHG

## 2018-08-06 VITALS — DIASTOLIC BLOOD PRESSURE: 71 MMHG | SYSTOLIC BLOOD PRESSURE: 111 MMHG

## 2018-08-06 VITALS — DIASTOLIC BLOOD PRESSURE: 85 MMHG | SYSTOLIC BLOOD PRESSURE: 96 MMHG

## 2018-08-06 VITALS — DIASTOLIC BLOOD PRESSURE: 70 MMHG | SYSTOLIC BLOOD PRESSURE: 107 MMHG

## 2018-08-06 VITALS — DIASTOLIC BLOOD PRESSURE: 70 MMHG | SYSTOLIC BLOOD PRESSURE: 136 MMHG

## 2018-08-06 VITALS — DIASTOLIC BLOOD PRESSURE: 87 MMHG | SYSTOLIC BLOOD PRESSURE: 104 MMHG

## 2018-08-06 VITALS — DIASTOLIC BLOOD PRESSURE: 76 MMHG | SYSTOLIC BLOOD PRESSURE: 136 MMHG

## 2018-08-06 VITALS — SYSTOLIC BLOOD PRESSURE: 111 MMHG | DIASTOLIC BLOOD PRESSURE: 62 MMHG

## 2018-08-06 VITALS — DIASTOLIC BLOOD PRESSURE: 69 MMHG | SYSTOLIC BLOOD PRESSURE: 93 MMHG

## 2018-08-06 VITALS — SYSTOLIC BLOOD PRESSURE: 118 MMHG | DIASTOLIC BLOOD PRESSURE: 70 MMHG

## 2018-08-06 VITALS — DIASTOLIC BLOOD PRESSURE: 70 MMHG | SYSTOLIC BLOOD PRESSURE: 95 MMHG

## 2018-08-06 VITALS — SYSTOLIC BLOOD PRESSURE: 89 MMHG | DIASTOLIC BLOOD PRESSURE: 57 MMHG

## 2018-08-06 VITALS — SYSTOLIC BLOOD PRESSURE: 88 MMHG | DIASTOLIC BLOOD PRESSURE: 74 MMHG

## 2018-08-06 VITALS — SYSTOLIC BLOOD PRESSURE: 95 MMHG | DIASTOLIC BLOOD PRESSURE: 76 MMHG

## 2018-08-06 VITALS — DIASTOLIC BLOOD PRESSURE: 82 MMHG | SYSTOLIC BLOOD PRESSURE: 105 MMHG

## 2018-08-06 VITALS — SYSTOLIC BLOOD PRESSURE: 92 MMHG | DIASTOLIC BLOOD PRESSURE: 75 MMHG

## 2018-08-06 VITALS — SYSTOLIC BLOOD PRESSURE: 100 MMHG | DIASTOLIC BLOOD PRESSURE: 77 MMHG

## 2018-08-06 VITALS — SYSTOLIC BLOOD PRESSURE: 87 MMHG | DIASTOLIC BLOOD PRESSURE: 57 MMHG

## 2018-08-06 VITALS — SYSTOLIC BLOOD PRESSURE: 120 MMHG | DIASTOLIC BLOOD PRESSURE: 78 MMHG

## 2018-08-06 VITALS — SYSTOLIC BLOOD PRESSURE: 102 MMHG | DIASTOLIC BLOOD PRESSURE: 90 MMHG

## 2018-08-06 LAB
ANION GAP SERPL CALC-SCNC: 16.7 MMOL/L (ref 8–16)
BASE EXCESS BLDA CALC-SCNC: 7 MMOL/L (ref -2–3)
BASOPHILS # BLD AUTO: 0.1 10*3/UL (ref 0–0.1)
BASOPHILS NFR BLD AUTO: 0.9 % (ref 0–1)
BUN SERPL-MCNC: 7 MG/DL (ref 7–26)
BUN/CREAT SERPL: 7 (ref 6–25)
CALCIUM SERPL-MCNC: 9.4 MG/DL (ref 8.4–10.2)
CHLORIDE SERPL-SCNC: 104 MMOL/L (ref 98–107)
CO2 SERPL-SCNC: 24 MMOL/L (ref 22–29)
DEPRECATED NEUTROPHILS # BLD AUTO: 5.2 10*3/UL (ref 2.1–6.9)
EGFRCR SERPLBLD CKD-EPI 2021: > 60 ML/MIN (ref 60–?)
EOSINOPHIL # BLD AUTO: 0.1 10*3/UL (ref 0–0.4)
EOSINOPHIL NFR BLD AUTO: 0.7 % (ref 0–6)
ERYTHROCYTE [DISTWIDTH] IN CORD BLOOD: 15.5 % (ref 11.7–14.4)
GLUCOSE SERPLBLD-MCNC: 198 MG/DL (ref 74–118)
HCO3 BLDA-SCNC: 30 MMOL/L (ref 23–28)
HCT VFR BLD AUTO: 43.1 % (ref 38.2–49.6)
HGB BLD-MCNC: 13.8 G/DL (ref 14–18)
LYMPHOCYTES # BLD: 1.3 10*3/UL (ref 1–3.2)
LYMPHOCYTES NFR BLD AUTO: 16.9 % (ref 18–39.1)
MAGNESIUM SERPL-MCNC: 1.8 MG/DL (ref 1.3–2.1)
MCH RBC QN AUTO: 34.5 PG (ref 28–32)
MCHC RBC AUTO-ENTMCNC: 32 G/DL (ref 31–35)
MCV RBC AUTO: 107.8 FL (ref 81–99)
MONOCYTES # BLD AUTO: 0.8 10*3/UL (ref 0.2–0.8)
MONOCYTES NFR BLD AUTO: 10 % (ref 4.4–11.3)
NEUTS SEG NFR BLD AUTO: 69.4 % (ref 38.7–80)
PCO2 BLDA: 39 MMHG (ref 41–51)
PCO2 BLDA: 72 MMHG (ref 80–105)
PH BLDA: 7.5 [PH] (ref 7.31–7.41)
PLATELET # BLD AUTO: 123 X10E3/UL (ref 140–360)
POTASSIUM SERPL-SCNC: 3.7 MMOL/L (ref 3.5–5.1)
RBC # BLD AUTO: 4 X10E6/UL (ref 4.3–5.7)
SAO2 % BLDA: 96 % (ref 95–98)
SODIUM SERPL-SCNC: 141 MMOL/L (ref 136–145)

## 2018-08-06 PROCEDURE — 02HV33Z INSERTION OF INFUSION DEVICE INTO SUPERIOR VENA CAVA, PERCUTANEOUS APPROACH: ICD-10-PCS

## 2018-08-06 RX ADMIN — TAZOBACTAM SODIUM AND PIPERACILLIN SODIUM SCH MLS/HR: 375; 3 INJECTION, SOLUTION INTRAVENOUS at 18:00

## 2018-08-06 RX ADMIN — FAMOTIDINE SCH MG: 20 TABLET, FILM COATED ORAL at 07:30

## 2018-08-06 RX ADMIN — Medication SCH MG: at 09:00

## 2018-08-06 RX ADMIN — VANCOMYCIN HYDROCHLORIDE SCH MLS/HR: 1 INJECTION, SOLUTION INTRAVENOUS at 22:24

## 2018-08-06 RX ADMIN — DEXTROSE MONOHYDRATE SCH MLS/HR: 100 INJECTION, SOLUTION INTRAVENOUS at 15:00

## 2018-08-06 RX ADMIN — INSULIN LISPRO SCH UNIT: 100 INJECTION, SOLUTION INTRAVENOUS; SUBCUTANEOUS at 18:29

## 2018-08-06 RX ADMIN — TAZOBACTAM SODIUM AND PIPERACILLIN SODIUM SCH MLS/HR: 375; 3 INJECTION, SOLUTION INTRAVENOUS at 01:26

## 2018-08-06 RX ADMIN — VANCOMYCIN HYDROCHLORIDE SCH MLS/HR: 1 INJECTION, SOLUTION INTRAVENOUS at 09:00

## 2018-08-06 RX ADMIN — SODIUM CHLORIDE SCH ML: 900 IRRIGANT IRRIGATION at 22:48

## 2018-08-06 RX ADMIN — AMIODARONE HYDROCHLORIDE SCH MG: 200 TABLET ORAL at 09:00

## 2018-08-06 RX ADMIN — SODIUM CHLORIDE SCH ML: 900 IRRIGANT IRRIGATION at 14:00

## 2018-08-06 RX ADMIN — DEXAMETHASONE SCH MG: 4 TABLET ORAL at 22:00

## 2018-08-06 RX ADMIN — Medication SCH MG: at 17:00

## 2018-08-06 RX ADMIN — ENALAPRIL MALEATE SCH MG: 10 TABLET ORAL at 09:00

## 2018-08-06 RX ADMIN — TAZOBACTAM SODIUM AND PIPERACILLIN SODIUM SCH MLS/HR: 375; 3 INJECTION, SOLUTION INTRAVENOUS at 12:00

## 2018-08-06 RX ADMIN — METOPROLOL TARTRATE SCH MG: 25 TABLET, FILM COATED ORAL at 09:00

## 2018-08-06 RX ADMIN — GENTAMICIN SULFATE SCH ML: 3 SOLUTION OPHTHALMIC at 18:05

## 2018-08-06 RX ADMIN — Medication PRN MG: at 01:34

## 2018-08-06 RX ADMIN — METOPROLOL TARTRATE SCH MG: 25 TABLET, FILM COATED ORAL at 17:00

## 2018-08-06 RX ADMIN — GENTAMICIN SULFATE SCH ML: 3 SOLUTION OPHTHALMIC at 22:24

## 2018-08-06 RX ADMIN — GLIPIZIDE SCH MG: 5 TABLET ORAL at 09:00

## 2018-08-06 RX ADMIN — ENALAPRIL MALEATE SCH MG: 10 TABLET ORAL at 17:00

## 2018-08-06 RX ADMIN — PHENYTOIN SODIUM SCH MLS/HR: 50 INJECTION INTRAMUSCULAR; INTRAVENOUS at 22:10

## 2018-08-06 RX ADMIN — SODIUM CHLORIDE SCH ML: 900 IRRIGANT IRRIGATION at 18:00

## 2018-08-06 RX ADMIN — INSULIN LISPRO SCH UNIT: 100 INJECTION, SOLUTION INTRAVENOUS; SUBCUTANEOUS at 07:30

## 2018-08-06 RX ADMIN — GENTAMICIN SULFATE SCH ML: 3 SOLUTION OPHTHALMIC at 05:43

## 2018-08-06 RX ADMIN — FAMOTIDINE SCH MG: 20 TABLET, FILM COATED ORAL at 16:30

## 2018-08-06 RX ADMIN — TAZOBACTAM SODIUM AND PIPERACILLIN SODIUM SCH MLS/HR: 375; 3 INJECTION, SOLUTION INTRAVENOUS at 05:43

## 2018-08-06 NOTE — DIAGNOSTIC IMAGING REPORT
PROCEDURE:

A single AP view of the chest.

 

COMPARISON: Same date 1107 hrs.

 

INDICATIONS:   PICC LINE PLACEMENT

     

FINDINGS:

Lines/tubes: Stable left chest wall port with tip overlying right 

atrium. New right PICC in place with tip overlying 

brachiocephalic/superior vena cava junction.

 

Lungs: Limited by shallow inspiration and body habitus. Pulmonary 

vascular congestion.

 

Pleura:  There is no significant pleural effusion or pneumothorax.

 

Heart and mediastinum:  The cardiomediastinal silhouette is enlarged.

 

Bones:  No acute bony abnormality.

 

IMPRESSION: 

 

Limited by shallow inspiration and body habitus.

New right PICC in place with tip overlying brachiocephalic/superior 

vena cava junction. No visible pneumothorax.

Enlarged cardiomediastinal silhouette and vascular congestion, 

accentuated by low lung volumes and technique.

 

Dictated by:  Jacob Ojeda M.D. on 8/06/2018 at 16:37     

Electronically approved by:  Jacob Ojeda M.D. on 8/06/2018 at 16:37

## 2018-08-06 NOTE — DIAGNOSTIC IMAGING REPORT
EXAM:  ABDOMEN-1VIEW (KUB),    

DATE: 8/6/2018 6:25 PM  

INDICATION: NG tube placement.

COMPARISON: None



FINDINGS:

LINES/TUBES: None



BOWEL PATTERN: No evidence for obstruction.



SOFT TISSUES: No abnormal calcifications. No mass effect.



LUNG BASES: Mid to lower hemithorax bilaterally demonstrate no acute

abnormality.



BONES: No acute findings.



IMPRESSION:

NG tube is not visualized; it is either not present, or possibly coiled in the

upper thoracic or cervical esophagus. Consider repositioning and follow-up with

a chest x-ray including the upper abdomen.











 



Signed by: Dr. ESTEPHANIA Cabrera M.D. on 8/6/2018 7:40 PM

## 2018-08-06 NOTE — DIAGNOSTIC IMAGING REPORT
Examination: CT head without contrast

Clinical Indication: Altered mental status.

Technique: Transaxial noncontrast images from the skull base through the vertex

were obtained. Sagittal and coronal reformatted images were done.

Comparison: Head CT performed August 2, 2018.



Findings:



There is significant motion artifact on the scan.



Scalp: No abnormalities.

Bones: Intact. No fractures.  No blastic or lytic lesions. 



Brain sulci: Appropriate for patient's age.

Ventricles: Unchanged ventriculomegaly.  



Extra-axial space:

No large hemorrhage.



Parenchyma: 

No large masses, large hemorrhage, or large acute or chronic cortical based

vascular insults.



Suprasellar region: No abnormalities.

Craniocervical junction: The foramen magnum is patent.  No Chiari one

malformation.



Additional findings: 

Prior endoscopic sinus surgery. Unchanged soft tissue mass centered in the

ethmoidectomy cavity. Unchanged extension and erosive changes. Please refer to

prior head CT performed August 2, 2018 for further detail.



Impression:



Despite limitation, no large intraparenchymal or extra-axial hemorrhage or

large acute territorial infarct when compared to prior CT performed on August 2, 2018.



Signed by: Dr. Lizz Neal M.D. on 8/6/2018 1:25 AM

## 2018-08-06 NOTE — PROGRESS NOTE
DATE:  August 06, 2018 



The patient appears lethargic and tired.  He had an episode of a fall over 

the weekend.  Repeat CT scan did not show any new lesions, bleed or 

infarction.



PHYSICAL EXAMINATION 

GENERAL:  Alert.  Appeared fatigued and tired.

HEENT:  Normocephalic and atraumatic.  Left eye swollen slightly.  

Left-sided facial swelling improving.  

NECK:  Supple.  

CHEST:  Clear to auscultation.

CARDIOVASCULAR:  Regular rate and rhythm.  

ABDOMEN:  Soft.

EXTREMITIES:  No edema.



LABS AND IMAGING:  Reviewed.



ASSESSMENT AND PLAN:  The patient has a history of sinus cancer, status 

post surgery and radiation.  The patient had recurrent cancer requiring 

multiple surgical resections.  The patient also had left-sided facial 

cellulitis.  Getting intravenous antibiotics and improving.  The patient is 

improving.



Sinus tumor.  The patient in process of following with [facility].  





Will continue remaining care.  Will follow the patient closely. 









DD:  08/06/2018 08:44

DT:  08/06/2018 09:01

Job#:  [id number]

## 2018-08-06 NOTE — CONSULTATION
DATE OF CONSULTATION:  2018 



NEUROLOGY CONSULTATION 



HISTORY OF PRESENT ILLNESS:  Mr. Byrd is a 48-year-old, 

right-hand-dominant man with past medical history significant for 

hypertension, hyperlipidemia, diabetes mellitus type 2, and sinus cancer 

admitted to [facility] on 2018, with left 

periorbital cellulitis.  Unfortunately, the patient is encephalopathic at 

this time.  History is obtained from the patient's mother, who is at the 

bedside.  



Mr. Byrd was brought into the emergency center at [facility] on 2018, with swelling surrounding his left eye, 

headache, and facial pain.  Mr. Byrd was diagnosed as having left 

periorbital cellulitis.  He was admitted to the hospital to receive 

intravenous antibiotics.



Though it is not documented in various notes available through the 

electronic medical record, the patient's mother reports he was "slightly 

confused" upon admission.  Mr. Byrd's mother observed his confusion to 

progressively worsen throughout his hospitalization.  Reportedly, there was 

significant worsening of the patient's confusion and agitation on the 

evening of 2018, which prompted further evaluation with a CT of 

the brain without contrast.  This study was noted to be stable when 

compared to the previous CT of the brain without contrast performed in the 

emergency center on 2018.  At approximately 2300 on 2018, the patient was given Ativan 0.5 mg IV once for treatment of 

agitation.  At approximately 0130 on 2018, the patient received 

morphine 1 mg for treatment of pain.



At approximately 0530 on 2018, the patient experienced a fall 

while in the bathroom.  Unfortunately, the fall was not witnessed.  Mr. Byrd was found on the floor of the bathroom by medical staff.  A rapid 

response was called, and the patient was transferred to the intensive care 

unit for further monitoring.



The patient's ICU nurse reports the patient has been confused and agitated 

throughout the day.  At one point, his sensorium mildly improved, and he 

was able to tell the nurse his name and location (hospital).  Otherwise, 

the patient has been difficult to arouse and has not answered orientation 

questions.  There has been no witnessed seizure activity.   



REVIEW OF SYSTEMS:  Unable to assess secondary to the patient being 

encephalopathic.



PAST MEDICAL HISTORY:  Hypertension, hyperlipidemia, diabetes mellitus type 

2, questionable cardiac arrhythmia, sinus cancer diagnosed 2 years ago 

status post multiple sinus surgeries and chemotherapy.  According to the 

patient's mother, Mr. Byrd was told he was in remission in the early 

summer of .



PAST SURGICAL HISTORY:  Multiple sinus surgeries for resection of cancerous 

lesions, rods and pins in both legs following trauma.



PAST HOSPITALIZATIONS:  Surgeries/procedures as listed.



FAMILY MEDICAL HISTORY:  The patient's paternal and maternal grandparents 

are .  Their medical histories are unknown.  The patient's father 

is .  He had a known history of diabetes mellitus.  The patient's 

mother is alive.  She reports an irregular heartbeat requiring placement of 

a pacemaker.  Mr. Byrd had 1 brother who is  from a motor 

vehicle accident.  He has 2 sisters, both of whom are living.  One sister 

has a prior history of genitourinary cancer.  The 2nd sister is healthy.  

The patient has no biological children.



SOCIAL HISTORY:  Mr. Byrd is single.  He completed school through the 

11th grade.  He works for the [employer] in [address].  The 

patient's mother does not report current or prior tobacco or recreational 

drug use.  Previously, the patient would drink several beers on the 

weekends.  However, he has not consumed alcohol in the last 4 or 5 months.



HOME MEDICATIONS

1. Norvasc 10 mg by mouth daily.

2. Enalapril 20 mg by mouth twice daily.

3. Glipizide 5 mg by mouth daily.

4. Metformin 500 mg by mouth twice daily.

5. NovoLog sliding scale subcutaneously before meals and at bedtime.

6. Zofran 4 mg sublingually every 6 hours as needed for nausea.

7. Methadone 10 mg by mouth every 6 hours as needed for pain.

8. Dexamethasone 2 mg by mouth 3 times daily.



ALLERGIES:  NO KNOWN DRUG ALLERGIES.  NO KNOWN FOOD ALLERGIES.  NO KNOWN 

ALLERGY TO LATEX.  NO KNOWN ALLERGIES TO IODINE OR OTHER CONTRAST 

MATERIALS.



PHYSICAL EXAMINATION

VITAL SIGNS:  Height is 75 inches, weight 270 pounds, BMI 33.7 kg per meter 

squared.  Blood pressure 116/74 mmHg.  Pulse 128 beats per minute.  

Respiratory rate 16 breaths per minute.  Oxygen saturation 98% on 4 liters 

by nasal cannula.  

GENERAL:  The patient is drowsy, arouses briefly to verbal and noxious 

stimuli.  No acute distress.  Obese.  

HEENT:  Normocephalic, atraumatic.  There is swelling surrounding the left 

eye.  The left pupil is 5 mm and sluggish.  The right pupil is 3.5 mm and 

sluggish.  Moist mucous membranes. 

NECK:  Supple.  Negative Kernig and Brudzinski signs.  No appreciable 

thyromegaly.  No appreciable carotid bruits. 

CARDIOVASCULAR:  S1, S2, regular rhythm, tachycardic.  No murmurs, rubs, or 

gallops. 

RESPIRATORY:  Clear to auscultation bilaterally.  No wheezes, rhonchi or 

rales.  

EXTREMITIES:  The skin is warm and dry.  No clubbing, cyanosis, or edema.  

The posterior tibial and dorsalis pedis pulses are 1+ and symmetric.  

SKIN:  No rashes or lesions.  

NEUROLOGIC EXAMINATION 

MEMORY/ATTENTION:  The patient is drowsy, arouses briefly to verbal and 

noxious stimuli.  Mr. Byrd is not oriented to person, place, time, or 

situation.  

CRANIAL NERVES:  The left pupil is 5 mm and sluggishly reactive.  The right 

pupil is 3.5 mm and sluggishly reactive.  Corneal reflexes are intact 

bilaterally.  The face appears symmetric.  Hearing is grossly intact.  The 

soft palate elevates equally and symmetrically.  The tongue protrudes 

midline and moves symmetrically from side to side.  

STRENGTH:  Bulk is normal.  Patient moves his extremities equally and 

symmetrically to central and peripheral noxious stimulation.  Tone is 

normal.  

DTRs:  Deep tendon reflexes are 1+ and symmetric at the triceps, biceps, 

brachioradialis, and patellas.  Deep tendon reflexes are absent and 

symmetric at the Achilles.  Plantar responses are flexor bilaterally.  

SENSATION:  As per motor exam.  

CEREBELLAR:  Unable to assess secondary to the patient being 

encephalopathic.  

GAIT:  Deferred.  

SPEECH:  Spontaneous speech is very limited.  Speech appears to be mildly 

dysarthric.  Unable to adequately assess for aphasia at this time.  

Repetition is not intact.  

INVOLUNTARY MOVEMENTS:  None.  

PRONATOR DRIFT:  As per motor exam.  



LABORATORY DATA:  Sodium 141, potassium 3.7, chloride 104, carbon dioxide 

24, anion gap 16.7.  BUN 7, creatinine 1.00, estimated GFR greater than 60, 

BUN-to-creatinine ratio 7.  Glucose 198, calcium 9.4,  magnesium 1.8.  From 

2018, total bilirubin 2.1, AST 23, ALT 33, alkaline phosphatase 

81, total protein 6.1, albumin 2.9, globulin 3.2, albumin-to-globulin ratio 

0.9.  TSH is 0.008, free T4 index 1.5594, thyroxine (T4) 5.05, T3 uptake 

30.88.  Hemoglobin A1c 7.7.  Total cholesterol 155, triglycerides 109, LDL 

cholesterol 71, HDL cholesterol 62.  CBC with differential and platelets 

reveals a white blood cell count of 7.50 with 69.4% neutrophils, 16.9% 

lymphocytes, 10.0% monocytes, 0.7% eosinophils, and 0.9% basophils.  The 

hemoglobin and hematocrit are 13.8 and 43.1, respectively.  The platelet 

count is 123.  From 2018, PT 13.2, INR 1.08, PTT 22.9.  An 

arterial blood gas reveals pH is 7.50, pCO2 of 39, pO2 of 72, bicarbonate 

30, oxygen saturation of 96.0, base excess of 7.0, and FiO2 of 38.  From 

2018, a urinalysis was significant for 3+ glucose and 11-20 white 

blood cells.  Thyroid peroxidase antibodies 8.  Blood cultures drawn on 

2018, show no growth after 72 hours.  Repeat blood cultures drawn 

on 2018, are pending.  A urine culture collected on 2018, revealed mixed candie.



DIAGNOSTIC STUDIES

1. Face CT, 2018:  1) Left periorbital swelling may reflect 

cellulitis.  No postseptal inflammatory changes or abscess.  2) No other 

changes from the previous maxillofacial CT of 2018.  3) Sinonasal 

postsurgical changes with nonspecific enhancing tissue, which presumably 

reflects tumor centered in the ethmoid cavity which has eroded the 

anterior skull base, extends into the left orbit, infiltrates the 

pituitary gland, infundibulum, and cavernous sinuses described.  4)  

Nonspecific inflammatory changes in the paranasal sinuses. 



2. Chest x-ray, 2018:  1) Lines/tubes stable.  Left port near the 

cavoatrial junction.  2) Stable cardiomediastinal silhouette.  3) Low 

lung volumes with bibasilar vascular crowding/atelectasis.  4) No 

significant effusion.  No pneumothorax.  



3. CT of the brain without contrast, 2018:  On my review, there is 

no evidence of recent large territorial ischemia, hemorrhage, mass, or 

mass effect.  There are calcified lesions along the anterior skull base 

as well as a partially calcified right temporal lobe lesion with 

surrounding vasogenic edema and local extension of the tumor to the dura 

along the anteroinferior left frontal and left temporal convexities.  

Mild ventriculomegaly is appreciated.  No hydrocephalus.  



4. Echocardiogram, 2018:  Ejection fraction is 55% to 60%.  

Concentric left ventricular hypertrophy.  Left atrial enlargement.  

Trace mitral and tricuspid regurgitation.  



5. CT of the brain without contrast, 2018:  No large 

intraparenchymal or extra-axial hemorrhage or large acute territorial 

infarct when compared to the prior CT dated 2018.



6. Chest x-ray, 2018:  Enlarged cardiomediastinal silhouette and 

pulmonary vascular congestion, accentuated by low lung volumes and 

technique.  



7. CT of the brain without contrast, 2018:  1) Grossly no acute 

posttraumatic intracranial hemorrhage.  2) Unchanged calcified lesions 

along the bilateral orbitofrontal gyri and right inferior temporal lobe 

with surrounding vasogenic edema.  3) Persistent mild ventriculomegaly.  

4) Grossly stable postoperative changes and residual disease in the 

paranasal sinuses.  Please see the dictation of maxillofacial CT on 

2018 for further details.  



8. Abdominal x-ray, 2018:  NG tube is not visualized.  It is 

either not present or possibly coiled in the upper thoracic or cervical 

esophagus.  Consider repositioning and follow up with a chest x-ray 

including the upper abdomen.



ASSESSMENT AND PLAN:  Mr. Byrd is a 48-year-old, right-hand-dominant man 

with past medical history as described, admitted to [facility] 

on 2018, with left periorbital cellulitis.  According to the 

patient's mother, Mr. Byrd was mildly confused upon admission.  His 

confusion and agitation have progressively worsened during his 

hospitalization.  On the evening of 2018, as well as on the 

morning of 2018, the patient experienced 2 falls, as described in 

the history of present illness.  Mr. Byrd has undergone a thorough 

neurological examination with findings documented as above.  His laboratory 

data and other diagnostic studies have been reviewed and are documented 

above.



At present, there is little concern for meningoencephalitis.  Mr. Byrd 

has been afebrile for more than 72 hours, his white blood cell count is 

trending downwards, and his neck is supple on general physical examination 

with negative Kernig and Brudzinski signs.  However, the findings on the 

patient's multiple CTs of the brain without contrast are concerning and may 

reveal the etiology of the patient's symptoms.  As documented above, the 

CTs of the brain have demonstrated a partially calcified right temporal 

lobe mass with surrounding vasogenic edema.  Vasogenic edema, when 

untreated, may cause confusion, generalized weakness, headaches, blurred 

vision, and agitation.  Furthermore, the presence of a mass in the right 

temporal lobe puts the patient at a greater risk for seizures.



RECOMMENDATIONS

1. Mr. Byrd will be prescribed dexamethasone 4 mg by mouth every 6 

hours for treatment of vasogenic edema.  It should be noted, the patient 

was on dexamethasone 2 mg by mouth 3 times daily at home.  However, this 

medication was not continued during his hospitalization. 

2. Due to the concern for subclinical seizure activity, Mr. Byrd will 

receive a loading dose of fosphenytoin 1,200 mg (10 mg per kg) 

intravenously once.  

3. A total phenytoin level will be drawn on the morning of 2018.

4. Treatment with Dilantin 300 mg by mouth at bedtime daily will begin 

on the evening of 2018.

5. A routine EEG 41 to 60 minutes will be performed on 2018, 

for further evaluation of seizures.  

6. Defer treatment of the remaining medical comorbidities to the primary 

and other services following the patient.



Thank you for this consultation.  I will continue to follow the patient 

while he remains in the hospital.  



Time spent:  70 minutes.





 



DD:  2018 20:09

DT:  2018 20:53

Job#:  [id number] VIRGINIE JACQUES

## 2018-08-06 NOTE — DIAGNOSTIC IMAGING REPORT
EXAMINATION: Head CT 



HISTORY: Altered mental status. Status post fall. Hyperglycemia, dehydration,

history of sinuses cancer with brain metastases.

COMPARISON: Head CT on 8/5/2018 and maxillofacial CT on 8/2/18

TECHNIQUE: Multidetector axial images were obtained without contrast from the

foramen magnum to the vertex . The images were reconstructed using brain and

bone algorithms.  Thin section brain images were reformatted into coronal and

sagittal planes.

Image quality: Motion/streaking artifact limits the evaluation of the skull

base and posterior cranial fossa.



FINDINGS:



     Parenchyma: 

1.  Unchanged ill-defined partially calcified lesion in the right inferior

temporal lobe with surrounding edema as well as similar calcified lesions along

the gyri recti and olfactory gyri at the skull base.

2.  No mass or hemorrhage. No CT evidence of acute territorial vascular insult.



    

     Extra-axial spaces:No abnormal density. No extra-axial fluid collections 

     Brain volume: Normal for age. 

     Ventricles: Persistent ventriculomegaly with effacement of the vertex

region sulci, mild degree of compensated hydrocephalus cannot be excluded.

     Arteries: No density suggestive of thrombus. 

     Dural sinuses: No abnormal density. 

     Extra-axial spaces: No abnormal density. 

     Foramen magnum: No mass, Chiari malformation, or basilar invagination. 

     Sella: No obvious mass.  

     Paranasal/mastoid sinuses: Sinonasal postsurgical changes with nonspecific

soft tissue tissue which presumably reflects tumor centered in the ethmoid

cavity which has eroded the anterior skull base, extends into the left orbit,

infiltrates the pituitary gland, infundibulum and cavernous sinus, better

visualized on contrast maxillofacial CT on 8-2-18.

     Skull/Scalp: No lytic or blastic lesions.  No fractures. 



IMPRESSION:



Overall suboptimal study due to motion.



1.  Grossly no acute post traumatic intracranial hemorrhage.



2.  Unchanged calcified lesions along the bilateral orbitofrontal gyri and

right inferior temporal lobe with surrounding vasogenic edema.



3.  Persistent mild ventriculomegaly.



4.  Grossly stable postoperative changes and residual disease in the paranasal

sinuses, please see the dictation of maxillofacial CT on 8/2/18 for further

detail.



Signed by: Dr. Laura Garcia M.D. on 8/6/2018 2:13 PM

## 2018-08-06 NOTE — PROGRESS NOTE
DATE:  August 06, 2018 



Mr. Byrd became more lethargic and had to be transferred to intensive 

care unit.  He is currently in the intensive care unit.  The patient with 

no fever and no chills.  



OBJECTIVE 

GENERAL:  Confused.  Does not seem to be in acute distress. 

VITAL SIGNS:   Stable, currently afebrile.

HEENT:   Not icteric.

NECK:   Supple.

CHEST:   Clear bilaterally.

HEART:   S1 and S2, no murmur.

ABDOMEN:   Soft. 



IMPRESSION:  Altered mental status.  



RECOMMENDATIONS:  Neuro consultation.  Neuro did visit the patient and felt 

that it is indicative of edema in the brain from cancer.  Will restart 

steroids.  Discussed with internal medicine.  Apparently, it was 

discontinued earlier upon admission because of elevated glucose.  I would 

recommend to push dexamethasone and to treat hyperglycemia accordingly.  

Will keep him on IV antibiotics for another day or two just because I am 

concerned about aspiration, but we can discontinue the antibiotic once he 

is clinically alert and oriented.  Will follow with you.  

   









DD:  08/06/2018 19:21

DT:  08/06/2018 19:32

Job#:  [id number]

## 2018-08-06 NOTE — CONSULTATION
DATE OF CONSULTATION:    



PULMONARY/CRITICAL CARE CONSULTATION



REFERRING PHYSICIAN:  Dr. Jacobo.



HISTORY OF PRESENT ILLNESS:  Patient is a 48-year-old man.  He has a 

history of a malignant sinus cancer.  He required multiple resections and 

subsequent radiation.  He came to the hospital complaining of worsening 

left eye pain, headache and facial pain.  He was seen by Oncology and 

Infectious Disease and felt to have some periorbital cellulitis.  He was 

started on IV antibiotics.



Over the past several days, his mental status has diminished.  He is not 

responding as well.  A subsequent CT scan showed no bleeding.



PAST MEDICAL HISTORY:  

1. Diabetes.

2. Hypertension.  

3. Sinus cancer with brain metastasis.



SOCIAL HISTORY:  No prior smoking or alcohol use.  



PRIOR SURGERIES:  Prior resection of the sinuses.



FAMILY HISTORY:  Family history is significant for hypertension.



REVIEW OF SYSTEMS:  The patient is afebrile.  The vital signs are stable.  

He has some headache.  He has worsening confusion.  He has no neck pain.  

He is not complaining of chest pain.  There is no shortness of breath.  

There is no nausea or vomiting.  He has no diarrhea. 

 

PHYSICAL EXAMINATION:   

VITAL SIGNS:  The patient is afebrile.  The vital signs are stable. 

HEENT:  Examination shows no facial swelling or erythema.  The nasal mucosa 

is normal.  The oropharynx is normal.  Examination does show some swelling 

of the left periorbital area.  The patient has no nuchal rigidity.

LYMPHATIC:  Examination shows no submandibular, cervical or supraclavicular 

adenopathy. 

CARDIAC:  Exam reveals a regular rate and rhythm with a normal S1 and S2.  

There are no murmurs or rubs. 

LUNGS:  Auscultation reveals clear breath sounds bilaterally.  There is no 

wheezing. 

ABDOMEN:  Is soft and nontender.  There is no rebound or guarding.

EXTREMITIES:  Examination shows no leg edema or calf tenderness.  There is 

no cyanosis or clubbing.

SKIN:  Examination shows no rashes.



LABORATORY DATA:  ___White blood cell count is 8.7 with a hemoglobin of 

10.5 and a platelet count of 58.  ___The white blood cell count is 7.5 with 

a hemoglobin of 13.8.  The platelet count is 123.  The BUN-to-creatinine 

ratio is normal.  The blood sugars are in the 200 to 250 range.  The pH is 

7.5 with a CO2 of 39 and O2 of 72 and a bicarbonate of 30.



RADIOGRAPHIC DATA:  Chest x-ray shows possible enlarged cardiac silhouette.



CT scan of the brain shows no acute abnormalities.  There is no 

hydrocephalus.



IMPRESSIONS:  

1. Worsening mental status, metabolic encephalopathy.

2. Periorbital cellulitis.

3. History of malignancy of the sinuses.

4. Hyperglycemia. 

5. Hypertension.  

6. Hyponatremia.



PLAN:  

1. Continue current antibiotics as recommended by ID.

2. Neurology evaluation.

3. Await results of repeat CT scan.

4. Monitor blood sugars.

5. Case discussed with nursing.

 







DD:  08/06/2018 14:00

DT:  08/06/2018 14:21

Job#:  [id number]

## 2018-08-06 NOTE — DIAGNOSTIC IMAGING REPORT
PROCEDURE:

A single AP view of the chest.

 

COMPARISON: 8/2/18

 

INDICATIONS:   AMS

     

FINDINGS:

Lines/tubes: Unchanged left chest wall port in place with tip overlying 

right atrium.

 

Lungs: Limited by body habitus and low lung volumes. Pulmonary vascular 

congestion. No definite focal consolidation.

 

Pleura:  There is no significant pleural effusion or pneumothorax.

 

Heart and mediastinum:  The cardiomediastinal silhouette is enlarged, 

accentuated by technique and low lung volumes.

 

Bones:  No acute bony abnormality.

 

IMPRESSION: 

 

Enlarged cardiomediastinal silhouette and pulmonary vascular 

congestion, accentuated by low lung volumes and technique.

 

 

Dictated by:  Jacob Ojeda M.D. on 8/06/2018 at 12:01     

Electronically approved by:  Jacob Ojeda M.D. on 8/06/2018 at 12:01

## 2018-08-07 VITALS — DIASTOLIC BLOOD PRESSURE: 83 MMHG | SYSTOLIC BLOOD PRESSURE: 98 MMHG

## 2018-08-07 VITALS — DIASTOLIC BLOOD PRESSURE: 86 MMHG | SYSTOLIC BLOOD PRESSURE: 114 MMHG

## 2018-08-07 VITALS — SYSTOLIC BLOOD PRESSURE: 102 MMHG | DIASTOLIC BLOOD PRESSURE: 67 MMHG

## 2018-08-07 VITALS — DIASTOLIC BLOOD PRESSURE: 76 MMHG | SYSTOLIC BLOOD PRESSURE: 97 MMHG

## 2018-08-07 VITALS — DIASTOLIC BLOOD PRESSURE: 85 MMHG | SYSTOLIC BLOOD PRESSURE: 115 MMHG

## 2018-08-07 VITALS — SYSTOLIC BLOOD PRESSURE: 104 MMHG | DIASTOLIC BLOOD PRESSURE: 83 MMHG

## 2018-08-07 VITALS — SYSTOLIC BLOOD PRESSURE: 108 MMHG | DIASTOLIC BLOOD PRESSURE: 89 MMHG

## 2018-08-07 VITALS — DIASTOLIC BLOOD PRESSURE: 94 MMHG | SYSTOLIC BLOOD PRESSURE: 114 MMHG

## 2018-08-07 VITALS — SYSTOLIC BLOOD PRESSURE: 90 MMHG | DIASTOLIC BLOOD PRESSURE: 67 MMHG

## 2018-08-07 VITALS — DIASTOLIC BLOOD PRESSURE: 67 MMHG | SYSTOLIC BLOOD PRESSURE: 90 MMHG

## 2018-08-07 VITALS — SYSTOLIC BLOOD PRESSURE: 71 MMHG | DIASTOLIC BLOOD PRESSURE: 48 MMHG

## 2018-08-07 VITALS — DIASTOLIC BLOOD PRESSURE: 78 MMHG | SYSTOLIC BLOOD PRESSURE: 106 MMHG

## 2018-08-07 VITALS — DIASTOLIC BLOOD PRESSURE: 70 MMHG | SYSTOLIC BLOOD PRESSURE: 107 MMHG

## 2018-08-07 VITALS — SYSTOLIC BLOOD PRESSURE: 96 MMHG | DIASTOLIC BLOOD PRESSURE: 81 MMHG

## 2018-08-07 VITALS — SYSTOLIC BLOOD PRESSURE: 111 MMHG | DIASTOLIC BLOOD PRESSURE: 90 MMHG

## 2018-08-07 VITALS — SYSTOLIC BLOOD PRESSURE: 129 MMHG | DIASTOLIC BLOOD PRESSURE: 107 MMHG

## 2018-08-07 VITALS — SYSTOLIC BLOOD PRESSURE: 97 MMHG | DIASTOLIC BLOOD PRESSURE: 83 MMHG

## 2018-08-07 VITALS — DIASTOLIC BLOOD PRESSURE: 80 MMHG | SYSTOLIC BLOOD PRESSURE: 100 MMHG

## 2018-08-07 VITALS — DIASTOLIC BLOOD PRESSURE: 87 MMHG | SYSTOLIC BLOOD PRESSURE: 121 MMHG

## 2018-08-07 VITALS — SYSTOLIC BLOOD PRESSURE: 94 MMHG | DIASTOLIC BLOOD PRESSURE: 74 MMHG

## 2018-08-07 VITALS — DIASTOLIC BLOOD PRESSURE: 87 MMHG | SYSTOLIC BLOOD PRESSURE: 123 MMHG

## 2018-08-07 VITALS — DIASTOLIC BLOOD PRESSURE: 90 MMHG | SYSTOLIC BLOOD PRESSURE: 112 MMHG

## 2018-08-07 VITALS — SYSTOLIC BLOOD PRESSURE: 104 MMHG | DIASTOLIC BLOOD PRESSURE: 72 MMHG

## 2018-08-07 VITALS — SYSTOLIC BLOOD PRESSURE: 103 MMHG | DIASTOLIC BLOOD PRESSURE: 85 MMHG

## 2018-08-07 VITALS — SYSTOLIC BLOOD PRESSURE: 73 MMHG | DIASTOLIC BLOOD PRESSURE: 61 MMHG

## 2018-08-07 VITALS — SYSTOLIC BLOOD PRESSURE: 115 MMHG | DIASTOLIC BLOOD PRESSURE: 85 MMHG

## 2018-08-07 VITALS — SYSTOLIC BLOOD PRESSURE: 90 MMHG | DIASTOLIC BLOOD PRESSURE: 72 MMHG

## 2018-08-07 VITALS — DIASTOLIC BLOOD PRESSURE: 86 MMHG | SYSTOLIC BLOOD PRESSURE: 97 MMHG

## 2018-08-07 VITALS — DIASTOLIC BLOOD PRESSURE: 91 MMHG | SYSTOLIC BLOOD PRESSURE: 102 MMHG

## 2018-08-07 VITALS — DIASTOLIC BLOOD PRESSURE: 71 MMHG | SYSTOLIC BLOOD PRESSURE: 96 MMHG

## 2018-08-07 VITALS — DIASTOLIC BLOOD PRESSURE: 80 MMHG | SYSTOLIC BLOOD PRESSURE: 107 MMHG

## 2018-08-07 VITALS — SYSTOLIC BLOOD PRESSURE: 95 MMHG | DIASTOLIC BLOOD PRESSURE: 66 MMHG

## 2018-08-07 VITALS — DIASTOLIC BLOOD PRESSURE: 86 MMHG | SYSTOLIC BLOOD PRESSURE: 93 MMHG

## 2018-08-07 VITALS — DIASTOLIC BLOOD PRESSURE: 87 MMHG | SYSTOLIC BLOOD PRESSURE: 108 MMHG

## 2018-08-07 VITALS — SYSTOLIC BLOOD PRESSURE: 105 MMHG | DIASTOLIC BLOOD PRESSURE: 82 MMHG

## 2018-08-07 VITALS — SYSTOLIC BLOOD PRESSURE: 102 MMHG | DIASTOLIC BLOOD PRESSURE: 70 MMHG

## 2018-08-07 VITALS — SYSTOLIC BLOOD PRESSURE: 108 MMHG | DIASTOLIC BLOOD PRESSURE: 77 MMHG

## 2018-08-07 VITALS — SYSTOLIC BLOOD PRESSURE: 105 MMHG | DIASTOLIC BLOOD PRESSURE: 93 MMHG

## 2018-08-07 VITALS — SYSTOLIC BLOOD PRESSURE: 117 MMHG | DIASTOLIC BLOOD PRESSURE: 90 MMHG

## 2018-08-07 VITALS — DIASTOLIC BLOOD PRESSURE: 74 MMHG | SYSTOLIC BLOOD PRESSURE: 99 MMHG

## 2018-08-07 VITALS — SYSTOLIC BLOOD PRESSURE: 110 MMHG | DIASTOLIC BLOOD PRESSURE: 102 MMHG

## 2018-08-07 VITALS — SYSTOLIC BLOOD PRESSURE: 95 MMHG | DIASTOLIC BLOOD PRESSURE: 81 MMHG

## 2018-08-07 VITALS — DIASTOLIC BLOOD PRESSURE: 89 MMHG | SYSTOLIC BLOOD PRESSURE: 120 MMHG

## 2018-08-07 VITALS — DIASTOLIC BLOOD PRESSURE: 76 MMHG | SYSTOLIC BLOOD PRESSURE: 95 MMHG

## 2018-08-07 VITALS — DIASTOLIC BLOOD PRESSURE: 83 MMHG | SYSTOLIC BLOOD PRESSURE: 108 MMHG

## 2018-08-07 VITALS — DIASTOLIC BLOOD PRESSURE: 88 MMHG | SYSTOLIC BLOOD PRESSURE: 101 MMHG

## 2018-08-07 VITALS — SYSTOLIC BLOOD PRESSURE: 103 MMHG | DIASTOLIC BLOOD PRESSURE: 77 MMHG

## 2018-08-07 LAB
ANION GAP SERPL CALC-SCNC: 15.9 MMOL/L (ref 8–16)
BASOPHILS # BLD AUTO: 0 10*3/UL (ref 0–0.1)
BASOPHILS NFR BLD AUTO: 0.4 % (ref 0–1)
BUN SERPL-MCNC: 12 MG/DL (ref 7–26)
BUN/CREAT SERPL: 10 (ref 6–25)
CALCIUM SERPL-MCNC: 9.2 MG/DL (ref 8.4–10.2)
CHLORIDE SERPL-SCNC: 104 MMOL/L (ref 98–107)
CO2 SERPL-SCNC: 26 MMOL/L (ref 22–29)
DEPRECATED NEUTROPHILS # BLD AUTO: 8.8 10*3/UL (ref 2.1–6.9)
EGFRCR SERPLBLD CKD-EPI 2021: > 60 ML/MIN (ref 60–?)
EOSINOPHIL # BLD AUTO: 0 10*3/UL (ref 0–0.4)
EOSINOPHIL NFR BLD AUTO: 0.1 % (ref 0–6)
ERYTHROCYTE [DISTWIDTH] IN CORD BLOOD: 15.3 % (ref 11.7–14.4)
GLUCOSE SERPLBLD-MCNC: 222 MG/DL (ref 74–118)
HCT VFR BLD AUTO: 37.3 % (ref 38.2–49.6)
HGB BLD-MCNC: 12.6 G/DL (ref 14–18)
LYMPHOCYTES # BLD: 0.4 10*3/UL (ref 1–3.2)
LYMPHOCYTES NFR BLD AUTO: 4 % (ref 18–39.1)
MAGNESIUM SERPL-MCNC: 1.7 MG/DL (ref 1.3–2.1)
MCH RBC QN AUTO: 34.3 PG (ref 28–32)
MCHC RBC AUTO-ENTMCNC: 33.8 G/DL (ref 31–35)
MCV RBC AUTO: 101.6 FL (ref 81–99)
MONOCYTES # BLD AUTO: 0.4 10*3/UL (ref 0.2–0.8)
MONOCYTES NFR BLD AUTO: 3.8 % (ref 4.4–11.3)
NEUTS SEG NFR BLD AUTO: 90.4 % (ref 38.7–80)
PLATELET # BLD AUTO: 144 X10E3/UL (ref 140–360)
POTASSIUM SERPL-SCNC: 3.9 MMOL/L (ref 3.5–5.1)
RBC # BLD AUTO: 3.67 X10E6/UL (ref 4.3–5.7)
SODIUM SERPL-SCNC: 142 MMOL/L (ref 136–145)

## 2018-08-07 RX ADMIN — TAZOBACTAM SODIUM AND PIPERACILLIN SODIUM SCH MLS/HR: 375; 3 INJECTION, SOLUTION INTRAVENOUS at 05:54

## 2018-08-07 RX ADMIN — DEXAMETHASONE SCH MG: 4 TABLET ORAL at 03:17

## 2018-08-07 RX ADMIN — TAZOBACTAM SODIUM AND PIPERACILLIN SODIUM SCH MLS/HR: 375; 3 INJECTION, SOLUTION INTRAVENOUS at 23:15

## 2018-08-07 RX ADMIN — TAZOBACTAM SODIUM AND PIPERACILLIN SODIUM SCH MLS/HR: 375; 3 INJECTION, SOLUTION INTRAVENOUS at 00:38

## 2018-08-07 RX ADMIN — DEXAMETHASONE SCH MG: 4 TABLET ORAL at 12:07

## 2018-08-07 RX ADMIN — GLIPIZIDE SCH MG: 5 TABLET ORAL at 09:00

## 2018-08-07 RX ADMIN — DEXAMETHASONE SCH MG: 4 TABLET ORAL at 05:54

## 2018-08-07 RX ADMIN — FAMOTIDINE SCH MG: 20 TABLET, FILM COATED ORAL at 09:32

## 2018-08-07 RX ADMIN — METOPROLOL TARTRATE SCH MG: 25 TABLET, FILM COATED ORAL at 09:00

## 2018-08-07 RX ADMIN — SODIUM CHLORIDE SCH ML: 900 IRRIGANT IRRIGATION at 17:51

## 2018-08-07 RX ADMIN — INSULIN LISPRO SCH UNIT: 100 INJECTION, SOLUTION INTRAVENOUS; SUBCUTANEOUS at 21:50

## 2018-08-07 RX ADMIN — INSULIN LISPRO SCH UNIT: 100 INJECTION, SOLUTION INTRAVENOUS; SUBCUTANEOUS at 12:11

## 2018-08-07 RX ADMIN — SODIUM CHLORIDE SCH ML: 900 IRRIGANT IRRIGATION at 05:54

## 2018-08-07 RX ADMIN — METOPROLOL TARTRATE SCH MG: 25 TABLET, FILM COATED ORAL at 16:39

## 2018-08-07 RX ADMIN — INSULIN LISPRO SCH UNIT: 100 INJECTION, SOLUTION INTRAVENOUS; SUBCUTANEOUS at 16:58

## 2018-08-07 RX ADMIN — ENALAPRIL MALEATE SCH MG: 10 TABLET ORAL at 09:00

## 2018-08-07 RX ADMIN — TAZOBACTAM SODIUM AND PIPERACILLIN SODIUM SCH MLS/HR: 375; 3 INJECTION, SOLUTION INTRAVENOUS at 12:07

## 2018-08-07 RX ADMIN — SODIUM CHLORIDE SCH ML: 900 IRRIGANT IRRIGATION at 10:00

## 2018-08-07 RX ADMIN — FAMOTIDINE SCH MG: 20 TABLET, FILM COATED ORAL at 16:40

## 2018-08-07 RX ADMIN — SODIUM CHLORIDE SCH ML: 900 IRRIGANT IRRIGATION at 02:25

## 2018-08-07 RX ADMIN — SODIUM CHLORIDE SCH ML: 900 IRRIGANT IRRIGATION at 21:50

## 2018-08-07 RX ADMIN — GENTAMICIN SULFATE SCH ML: 3 SOLUTION OPHTHALMIC at 15:01

## 2018-08-07 RX ADMIN — DEXAMETHASONE SCH MG: 4 TABLET ORAL at 17:51

## 2018-08-07 RX ADMIN — INSULIN LISPRO SCH UNIT: 100 INJECTION, SOLUTION INTRAVENOUS; SUBCUTANEOUS at 06:07

## 2018-08-07 RX ADMIN — PHENYTOIN SODIUM SCH MLS/HR: 50 INJECTION INTRAMUSCULAR; INTRAVENOUS at 21:50

## 2018-08-07 RX ADMIN — DEXAMETHASONE SCH MG: 4 TABLET ORAL at 23:15

## 2018-08-07 RX ADMIN — GENTAMICIN SULFATE SCH ML: 3 SOLUTION OPHTHALMIC at 21:50

## 2018-08-07 RX ADMIN — INSULIN LISPRO SCH UNIT: 100 INJECTION, SOLUTION INTRAVENOUS; SUBCUTANEOUS at 00:36

## 2018-08-07 RX ADMIN — SODIUM CHLORIDE SCH ML: 900 IRRIGANT IRRIGATION at 12:22

## 2018-08-07 RX ADMIN — VANCOMYCIN HYDROCHLORIDE SCH MLS/HR: 1 INJECTION, SOLUTION INTRAVENOUS at 21:50

## 2018-08-07 RX ADMIN — GENTAMICIN SULFATE SCH ML: 3 SOLUTION OPHTHALMIC at 05:54

## 2018-08-07 RX ADMIN — AMIODARONE HYDROCHLORIDE SCH MG: 200 TABLET ORAL at 09:32

## 2018-08-07 RX ADMIN — VANCOMYCIN HYDROCHLORIDE SCH MLS/HR: 1 INJECTION, SOLUTION INTRAVENOUS at 09:34

## 2018-08-07 RX ADMIN — Medication SCH MG: at 16:40

## 2018-08-07 RX ADMIN — Medication SCH MG: at 09:32

## 2018-08-07 RX ADMIN — TAZOBACTAM SODIUM AND PIPERACILLIN SODIUM SCH MLS/HR: 375; 3 INJECTION, SOLUTION INTRAVENOUS at 17:51

## 2018-08-07 RX ADMIN — SODIUM CHLORIDE SCH MLS/HR: 4.5 INJECTION, SOLUTION INTRAVENOUS at 08:42

## 2018-08-07 NOTE — ELECTROENCEPHALOGRAM
DATE OF STUDY:  August 07, 2018 



REQUESTING PHYSICIAN:  Dr. Kadie Hector.



PATIENT HISTORY:  This 48-year-old man with A history of sinus cancer with 

metastatic lesions to the brain is having an EEG for evaluation of 

epileptiform activity. The patient is taking the following medications that 

might affect the EEG:  Dilantin. 



TECHNIQUE:  This is a routine, portable EEG, recorded digitally using the 

International 10-20 Electrode Placement System and done in the inpatient 

setting with the patient awake and confused. The EEG is adequate for 

interpretation. 



DESCRIPTION:  Well-organized, well-sustained, 5-6 Hz activity is best seen 

symmetrically in the posterior head regions. Rare spike-slow wave 

discharges are observed in the right temporal region. Sleep is not 

recorded. Photic stimulation produces a driving response. Hyperventilation 

is not performed.



INTERPRETATION:  This EEG is abnormal with the patient awake and confused 

due to the following:  Rare epileptiform discharges in the right temporal 

region suggestive of an underlying structural or electrical disturbance in 

this region. Clinical correlation is recommended. There is generalized 

slowing of the background electrocortical activity compatible with a moderate 

diffuse encephalopathy. Clinical correlation is recommended. 









DD:  08/07/2018 15:05

DT:  08/07/2018 15:19

Job#:  [id number]



MTDD

## 2018-08-07 NOTE — PROGRESS NOTE
DATE:  August 07, 2018 



The patient was seen and examined today.  The patient appeared fatigued, 

lethargic, tired, and is still mildly confused.  



PHYSICAL EXAMINATION 

GENERAL:  Alert, awake and communicative.

HEENT:  Normocephalic and atraumatic with left eye swelling.  Sclerae pink. 

 Conjunctivae clear.

NECK:  Supple.

CHEST:  Clear to auscultation. 

CARDIOVASCULAR:  Regular rate and rhythm.  

ABDOMEN:  Soft.

EXTREMITIES:  No edema.



LABS AND IMAGING:  Reviewed.



ASSESSMENT AND PLAN:  The patient with a history of multiple medical 

conditions currently in the hospital with left face cellulitis, worsening 

confusion.  The patient was recently started on dexamethasone by radiation 

oncologist.  It caused his worsening confusion and also causing elevated 

blood sugar level.  The patient's steroids were discontinued.  The patient 

is scheduled for followup with [facility].  The patient's imaging at 

admission did not show any new events or any new edema.  The patient is 

currently following with neurology.  The patient was restarted on steroid 

treatment with antiseizure treatment.  Recommendations to follow with 

neurology recommendations.  Current count is stable.  Will continue 

remaining care.  The patient will be followed at [facility] for further 

workup.  Will follow the patient. 









DD:  08/07/2018 09:01

DT:  08/07/2018 09:14

Job#:  [id number]

## 2018-08-08 VITALS — DIASTOLIC BLOOD PRESSURE: 82 MMHG | SYSTOLIC BLOOD PRESSURE: 117 MMHG

## 2018-08-08 VITALS — SYSTOLIC BLOOD PRESSURE: 126 MMHG | DIASTOLIC BLOOD PRESSURE: 73 MMHG

## 2018-08-08 VITALS — SYSTOLIC BLOOD PRESSURE: 129 MMHG | DIASTOLIC BLOOD PRESSURE: 94 MMHG

## 2018-08-08 VITALS — DIASTOLIC BLOOD PRESSURE: 68 MMHG | SYSTOLIC BLOOD PRESSURE: 106 MMHG

## 2018-08-08 VITALS — SYSTOLIC BLOOD PRESSURE: 105 MMHG | DIASTOLIC BLOOD PRESSURE: 73 MMHG

## 2018-08-08 VITALS — SYSTOLIC BLOOD PRESSURE: 125 MMHG | DIASTOLIC BLOOD PRESSURE: 88 MMHG

## 2018-08-08 VITALS — DIASTOLIC BLOOD PRESSURE: 101 MMHG | SYSTOLIC BLOOD PRESSURE: 125 MMHG

## 2018-08-08 VITALS — SYSTOLIC BLOOD PRESSURE: 135 MMHG | DIASTOLIC BLOOD PRESSURE: 83 MMHG

## 2018-08-08 VITALS — DIASTOLIC BLOOD PRESSURE: 77 MMHG | SYSTOLIC BLOOD PRESSURE: 100 MMHG

## 2018-08-08 VITALS — SYSTOLIC BLOOD PRESSURE: 116 MMHG | DIASTOLIC BLOOD PRESSURE: 83 MMHG

## 2018-08-08 VITALS — DIASTOLIC BLOOD PRESSURE: 80 MMHG | SYSTOLIC BLOOD PRESSURE: 99 MMHG

## 2018-08-08 VITALS — DIASTOLIC BLOOD PRESSURE: 98 MMHG | SYSTOLIC BLOOD PRESSURE: 118 MMHG

## 2018-08-08 VITALS — DIASTOLIC BLOOD PRESSURE: 102 MMHG | SYSTOLIC BLOOD PRESSURE: 139 MMHG

## 2018-08-08 VITALS — DIASTOLIC BLOOD PRESSURE: 77 MMHG | SYSTOLIC BLOOD PRESSURE: 106 MMHG

## 2018-08-08 VITALS — DIASTOLIC BLOOD PRESSURE: 101 MMHG | SYSTOLIC BLOOD PRESSURE: 135 MMHG

## 2018-08-08 VITALS — DIASTOLIC BLOOD PRESSURE: 97 MMHG | SYSTOLIC BLOOD PRESSURE: 123 MMHG

## 2018-08-08 VITALS — DIASTOLIC BLOOD PRESSURE: 103 MMHG | SYSTOLIC BLOOD PRESSURE: 121 MMHG

## 2018-08-08 VITALS — SYSTOLIC BLOOD PRESSURE: 115 MMHG | DIASTOLIC BLOOD PRESSURE: 89 MMHG

## 2018-08-08 VITALS — SYSTOLIC BLOOD PRESSURE: 109 MMHG | DIASTOLIC BLOOD PRESSURE: 76 MMHG

## 2018-08-08 VITALS — SYSTOLIC BLOOD PRESSURE: 132 MMHG | DIASTOLIC BLOOD PRESSURE: 103 MMHG

## 2018-08-08 VITALS — SYSTOLIC BLOOD PRESSURE: 104 MMHG | DIASTOLIC BLOOD PRESSURE: 67 MMHG

## 2018-08-08 VITALS — DIASTOLIC BLOOD PRESSURE: 76 MMHG | SYSTOLIC BLOOD PRESSURE: 117 MMHG

## 2018-08-08 VITALS — SYSTOLIC BLOOD PRESSURE: 89 MMHG | DIASTOLIC BLOOD PRESSURE: 67 MMHG

## 2018-08-08 VITALS — DIASTOLIC BLOOD PRESSURE: 99 MMHG | SYSTOLIC BLOOD PRESSURE: 138 MMHG

## 2018-08-08 VITALS — DIASTOLIC BLOOD PRESSURE: 99 MMHG | SYSTOLIC BLOOD PRESSURE: 169 MMHG

## 2018-08-08 VITALS — DIASTOLIC BLOOD PRESSURE: 58 MMHG | SYSTOLIC BLOOD PRESSURE: 89 MMHG

## 2018-08-08 VITALS — SYSTOLIC BLOOD PRESSURE: 123 MMHG | DIASTOLIC BLOOD PRESSURE: 96 MMHG

## 2018-08-08 VITALS — SYSTOLIC BLOOD PRESSURE: 96 MMHG | DIASTOLIC BLOOD PRESSURE: 71 MMHG

## 2018-08-08 VITALS — SYSTOLIC BLOOD PRESSURE: 141 MMHG | DIASTOLIC BLOOD PRESSURE: 96 MMHG

## 2018-08-08 VITALS — DIASTOLIC BLOOD PRESSURE: 29 MMHG | SYSTOLIC BLOOD PRESSURE: 95 MMHG

## 2018-08-08 VITALS — DIASTOLIC BLOOD PRESSURE: 63 MMHG | SYSTOLIC BLOOD PRESSURE: 101 MMHG

## 2018-08-08 VITALS — DIASTOLIC BLOOD PRESSURE: 76 MMHG | SYSTOLIC BLOOD PRESSURE: 109 MMHG

## 2018-08-08 VITALS — DIASTOLIC BLOOD PRESSURE: 99 MMHG | SYSTOLIC BLOOD PRESSURE: 154 MMHG

## 2018-08-08 LAB
ALBUMIN SERPL-MCNC: 2.6 G/DL (ref 3.5–5)
ALBUMIN/GLOB SERPL: 0.9 {RATIO} (ref 0.8–2)
ALP SERPL-CCNC: 81 IU/L (ref 40–150)
ALT SERPL-CCNC: 22 IU/L (ref 0–55)
ANION GAP SERPL CALC-SCNC: 16.7 MMOL/L (ref 8–16)
BASOPHILS # BLD AUTO: 0 10*3/UL (ref 0–0.1)
BASOPHILS NFR BLD AUTO: 0.2 % (ref 0–1)
BUN SERPL-MCNC: 18 MG/DL (ref 7–26)
BUN/CREAT SERPL: 14 (ref 6–25)
CALCIUM SERPL-MCNC: 8.9 MG/DL (ref 8.4–10.2)
CHLORIDE SERPL-SCNC: 102 MMOL/L (ref 98–107)
CO2 SERPL-SCNC: 24 MMOL/L (ref 22–29)
DEPRECATED NEUTROPHILS # BLD AUTO: 9.4 10*3/UL (ref 2.1–6.9)
EGFRCR SERPLBLD CKD-EPI 2021: 60 ML/MIN (ref 60–?)
EOSINOPHIL # BLD AUTO: 0 10*3/UL (ref 0–0.4)
EOSINOPHIL NFR BLD AUTO: 0 % (ref 0–6)
ERYTHROCYTE [DISTWIDTH] IN CORD BLOOD: 14.7 % (ref 11.7–14.4)
GLOBULIN PLAS-MCNC: 3 G/DL (ref 2.3–3.5)
GLUCOSE SERPLBLD-MCNC: 291 MG/DL (ref 74–118)
HCT VFR BLD AUTO: 35.1 % (ref 38.2–49.6)
HGB BLD-MCNC: 11.9 G/DL (ref 14–18)
LYMPHOCYTES # BLD: 0.4 10*3/UL (ref 1–3.2)
LYMPHOCYTES NFR BLD AUTO: 4.1 % (ref 18–39.1)
MCH RBC QN AUTO: 34.2 PG (ref 28–32)
MCHC RBC AUTO-ENTMCNC: 33.9 G/DL (ref 31–35)
MCV RBC AUTO: 100.9 FL (ref 81–99)
MONOCYTES # BLD AUTO: 0.7 10*3/UL (ref 0.2–0.8)
MONOCYTES NFR BLD AUTO: 6.3 % (ref 4.4–11.3)
NEUTS SEG NFR BLD AUTO: 88.2 % (ref 38.7–80)
PLATELET # BLD AUTO: 143 X10E3/UL (ref 140–360)
POTASSIUM SERPL-SCNC: 3.7 MMOL/L (ref 3.5–5.1)
RBC # BLD AUTO: 3.48 X10E6/UL (ref 4.3–5.7)
SODIUM SERPL-SCNC: 139 MMOL/L (ref 136–145)

## 2018-08-08 RX ADMIN — INSULIN LISPRO SCH UNIT: 100 INJECTION, SOLUTION INTRAVENOUS; SUBCUTANEOUS at 12:14

## 2018-08-08 RX ADMIN — METOPROLOL TARTRATE SCH MG: 25 TABLET, FILM COATED ORAL at 17:00

## 2018-08-08 RX ADMIN — GENTAMICIN SULFATE SCH ML: 3 SOLUTION OPHTHALMIC at 21:10

## 2018-08-08 RX ADMIN — SODIUM CHLORIDE SCH ML: 900 IRRIGANT IRRIGATION at 02:15

## 2018-08-08 RX ADMIN — DEXAMETHASONE SCH MG: 4 TABLET ORAL at 05:20

## 2018-08-08 RX ADMIN — METOPROLOL TARTRATE SCH MG: 25 TABLET, FILM COATED ORAL at 08:32

## 2018-08-08 RX ADMIN — SODIUM CHLORIDE SCH MLS/HR: 4.5 INJECTION, SOLUTION INTRAVENOUS at 01:00

## 2018-08-08 RX ADMIN — INSULIN LISPRO SCH UNIT: 100 INJECTION, SOLUTION INTRAVENOUS; SUBCUTANEOUS at 16:30

## 2018-08-08 RX ADMIN — TAZOBACTAM SODIUM AND PIPERACILLIN SODIUM SCH MLS/HR: 375; 3 INJECTION, SOLUTION INTRAVENOUS at 18:48

## 2018-08-08 RX ADMIN — INSULIN LISPRO SCH UNIT: 100 INJECTION, SOLUTION INTRAVENOUS; SUBCUTANEOUS at 12:15

## 2018-08-08 RX ADMIN — DEXAMETHASONE SCH MG: 4 TABLET ORAL at 23:19

## 2018-08-08 RX ADMIN — VANCOMYCIN HYDROCHLORIDE SCH MLS/HR: 1 INJECTION, SOLUTION INTRAVENOUS at 08:14

## 2018-08-08 RX ADMIN — FAMOTIDINE SCH MG: 20 TABLET, FILM COATED ORAL at 16:30

## 2018-08-08 RX ADMIN — FAMOTIDINE SCH MG: 20 TABLET, FILM COATED ORAL at 08:14

## 2018-08-08 RX ADMIN — SODIUM CHLORIDE SCH MLS/HR: 4.5 INJECTION, SOLUTION INTRAVENOUS at 16:15

## 2018-08-08 RX ADMIN — INSULIN LISPRO SCH UNIT: 100 INJECTION, SOLUTION INTRAVENOUS; SUBCUTANEOUS at 21:09

## 2018-08-08 RX ADMIN — AMIODARONE HYDROCHLORIDE SCH MG: 200 TABLET ORAL at 08:31

## 2018-08-08 RX ADMIN — GENTAMICIN SULFATE SCH ML: 3 SOLUTION OPHTHALMIC at 05:20

## 2018-08-08 RX ADMIN — DEXAMETHASONE SCH MG: 4 TABLET ORAL at 12:19

## 2018-08-08 RX ADMIN — PHENYTOIN SODIUM SCH MLS/HR: 50 INJECTION INTRAMUSCULAR; INTRAVENOUS at 21:50

## 2018-08-08 RX ADMIN — Medication SCH MG: at 17:00

## 2018-08-08 RX ADMIN — ENALAPRIL MALEATE SCH MG: 5 TABLET ORAL at 17:00

## 2018-08-08 RX ADMIN — DEXAMETHASONE SCH MG: 4 TABLET ORAL at 18:00

## 2018-08-08 RX ADMIN — INSULIN LISPRO SCH UNIT: 100 INJECTION, SOLUTION INTRAVENOUS; SUBCUTANEOUS at 08:10

## 2018-08-08 RX ADMIN — TAZOBACTAM SODIUM AND PIPERACILLIN SODIUM SCH MLS/HR: 375; 3 INJECTION, SOLUTION INTRAVENOUS at 12:19

## 2018-08-08 RX ADMIN — GENTAMICIN SULFATE SCH ML: 3 SOLUTION OPHTHALMIC at 14:02

## 2018-08-08 RX ADMIN — Medication SCH MG: at 08:15

## 2018-08-08 RX ADMIN — SODIUM CHLORIDE SCH ML: 900 IRRIGANT IRRIGATION at 05:20

## 2018-08-08 RX ADMIN — TAZOBACTAM SODIUM AND PIPERACILLIN SODIUM SCH MLS/HR: 375; 3 INJECTION, SOLUTION INTRAVENOUS at 05:20

## 2018-08-08 NOTE — DIAGNOSTIC IMAGING REPORT
PROCEDURE:

A single AP view of the chest.

 

COMPARISON: Chest radiograph 8/6/18 and KUB 8/6/18

 

INDICATIONS:   DECREASED OXYGEN SATURATION

     

FINDINGS:

Lines/tubes:  Interval removal of right sided PICC. Left sided port 

with tip terminating at the cavoatrial junction. Interval placement of 

an NG tube, the tip is obscured but likely terminates in the distal 

esophagus. Overlying EKG leads.

 

Lungs:  Low lung volumes. No evidence of consolidation. Mild perihilar 

opacities.

 

Pleura:  There is no pleural effusion or pneumothorax.

 

Heart and mediastinum:  The cardiomediastinal silhouette is unchanged.

 

Bones:  No acute bony abnormality.

 

IMPRESSION: 

 

NG tube tip is obscured but likely terminates likely in the distal 

esophagus. Recommend advancement by approximately 8 cm and a follow-up 

abdominal radiograph. 

 

Low lung volumes, cannot exclude mild pulmonary interstitial edema. 

 

No evidence of pneumonia.

 

Above findings were discussed with ICU CEZAR Lewis on 8/8/18 at 908AM. 

 

Dictated by:  AMINTA SCHUMACHER M.D. on 8/08/2018 at 9:09     

Electronically approved by:  AMINTA SCHUMACHER M.D. on 8/08/2018 at 9:09

## 2018-08-08 NOTE — PROGRESS NOTE
DATE:  August 08, 2018 



SUBJECTIVE:  Patient seen and examined today.  Patient appeared 

comfortable.  Clinically doing better.  No worsening event noted.



PHYSICAL EXAMINATION:

GENERAL:  Alert, awake, communicative.

HEENT:  Normocephalic, atraumatic.  Sclerae pale.  Left eye __________ mild 

swelling, improving.

NECK:  Supple.

CHEST:  Clear to auscultation.

CARDIOVASCULAR:  Regular rate and rhythm.

ABDOMEN:  Soft, nontender.

EXTREMITIES:  No edema.



LABS AND IMAGING:  Reviewed.



ASSESSMENT AND PLAN:  Patient with history of multiple medical conditions, 

include ethmoid cancer, status post multiple surgeries, radiation, admitted 

with confusion, hyperglycemia, leukocytosis.  Computerized axial tomography 

scan showed no evidence of any acute lesion, currently following 

neurologist.  On steroid, antiseizure medication.  Clinical condition is 

stable.  At current, recommendation to continue current care.  Clinically 

doing better.



RECOMMENDATION:  Outpatient follow with MD Fox.  Will continue 

remaining care.  Will follow patient closely.







DD:  08/08/2018 08:43

DT:  08/08/2018 08:48

Job#:  [id number]

## 2018-08-08 NOTE — DIAGNOSTIC IMAGING REPORT
PROCEDURE:X-RAY MODIFIED BARIUM SWALLOW

 

COMPARISON:None.

 

INDICATIONS:Not provided.

 

DISCUSSION:Fluoroscopic examination was performed in conjunction with 

speech pathology, during swallowing of a variety of thin and thick 

liquid consistencies.

 

CONCLUSION:No penetration or aspiration.  Please see the report from 

speech pathology for complete details.

 

 

Dictated by:  AMINTA SCHUMACHER M.D. on 8/08/2018 at 14:42     

Electronically approved by:  AMINTA SCHUMACHER M.D. on 8/08/2018 at 14:42

## 2018-08-09 VITALS — DIASTOLIC BLOOD PRESSURE: 83 MMHG | SYSTOLIC BLOOD PRESSURE: 139 MMHG

## 2018-08-09 VITALS — DIASTOLIC BLOOD PRESSURE: 104 MMHG | SYSTOLIC BLOOD PRESSURE: 147 MMHG

## 2018-08-09 VITALS — DIASTOLIC BLOOD PRESSURE: 95 MMHG | SYSTOLIC BLOOD PRESSURE: 142 MMHG

## 2018-08-09 VITALS — SYSTOLIC BLOOD PRESSURE: 113 MMHG | DIASTOLIC BLOOD PRESSURE: 75 MMHG

## 2018-08-09 VITALS — SYSTOLIC BLOOD PRESSURE: 142 MMHG | DIASTOLIC BLOOD PRESSURE: 95 MMHG

## 2018-08-09 VITALS — DIASTOLIC BLOOD PRESSURE: 101 MMHG | SYSTOLIC BLOOD PRESSURE: 157 MMHG

## 2018-08-09 VITALS — SYSTOLIC BLOOD PRESSURE: 141 MMHG | DIASTOLIC BLOOD PRESSURE: 78 MMHG

## 2018-08-09 LAB
ANION GAP SERPL CALC-SCNC: 12.8 MMOL/L (ref 8–16)
BASOPHILS # BLD AUTO: 0 10*3/UL (ref 0–0.1)
BASOPHILS NFR BLD AUTO: 0.1 % (ref 0–1)
BUN SERPL-MCNC: 17 MG/DL (ref 7–26)
BUN/CREAT SERPL: 16 (ref 6–25)
CALCIUM SERPL-MCNC: 9.1 MG/DL (ref 8.4–10.2)
CHLORIDE SERPL-SCNC: 103 MMOL/L (ref 98–107)
CO2 SERPL-SCNC: 26 MMOL/L (ref 22–29)
DEPRECATED NEUTROPHILS # BLD AUTO: 7.5 10*3/UL (ref 2.1–6.9)
EGFRCR SERPLBLD CKD-EPI 2021: > 60 ML/MIN (ref 60–?)
EOSINOPHIL # BLD AUTO: 0 10*3/UL (ref 0–0.4)
EOSINOPHIL NFR BLD AUTO: 0.1 % (ref 0–6)
ERYTHROCYTE [DISTWIDTH] IN CORD BLOOD: 14.6 % (ref 11.7–14.4)
GLUCOSE SERPLBLD-MCNC: 111 MG/DL (ref 74–118)
HCT VFR BLD AUTO: 31.8 % (ref 38.2–49.6)
HGB BLD-MCNC: 11.1 G/DL (ref 14–18)
LYMPHOCYTES # BLD: 0.4 10*3/UL (ref 1–3.2)
LYMPHOCYTES NFR BLD AUTO: 5 % (ref 18–39.1)
MAGNESIUM SERPL-MCNC: 1.9 MG/DL (ref 1.3–2.1)
MCH RBC QN AUTO: 34.3 PG (ref 28–32)
MCHC RBC AUTO-ENTMCNC: 34.9 G/DL (ref 31–35)
MCV RBC AUTO: 98.1 FL (ref 81–99)
MONOCYTES # BLD AUTO: 0.7 10*3/UL (ref 0.2–0.8)
MONOCYTES NFR BLD AUTO: 8.2 % (ref 4.4–11.3)
NEUTS SEG NFR BLD AUTO: 85.5 % (ref 38.7–80)
PLATELET # BLD AUTO: 123 X10E3/UL (ref 140–360)
POTASSIUM SERPL-SCNC: 3.8 MMOL/L (ref 3.5–5.1)
RBC # BLD AUTO: 3.24 X10E6/UL (ref 4.3–5.7)
SODIUM SERPL-SCNC: 138 MMOL/L (ref 136–145)

## 2018-08-09 RX ADMIN — FAMOTIDINE SCH MG: 20 TABLET, FILM COATED ORAL at 08:15

## 2018-08-09 RX ADMIN — GENTAMICIN SULFATE SCH ML: 3 SOLUTION OPHTHALMIC at 05:55

## 2018-08-09 RX ADMIN — FAMOTIDINE SCH MG: 20 TABLET, FILM COATED ORAL at 16:30

## 2018-08-09 RX ADMIN — SODIUM CHLORIDE SCH MLS/HR: 4.5 INJECTION, SOLUTION INTRAVENOUS at 05:55

## 2018-08-09 RX ADMIN — METOPROLOL TARTRATE SCH MG: 25 TABLET, FILM COATED ORAL at 08:15

## 2018-08-09 RX ADMIN — DEXAMETHASONE SCH MG: 4 TABLET ORAL at 06:02

## 2018-08-09 RX ADMIN — INSULIN LISPRO SCH UNIT: 100 INJECTION, SOLUTION INTRAVENOUS; SUBCUTANEOUS at 08:15

## 2018-08-09 RX ADMIN — INSULIN LISPRO SCH UNIT: 100 INJECTION, SOLUTION INTRAVENOUS; SUBCUTANEOUS at 21:29

## 2018-08-09 RX ADMIN — GENTAMICIN SULFATE SCH ML: 3 SOLUTION OPHTHALMIC at 21:30

## 2018-08-09 RX ADMIN — GENTAMICIN SULFATE SCH ML: 3 SOLUTION OPHTHALMIC at 14:00

## 2018-08-09 RX ADMIN — INSULIN LISPRO SCH UNIT: 100 INJECTION, SOLUTION INTRAVENOUS; SUBCUTANEOUS at 11:30

## 2018-08-09 RX ADMIN — TAZOBACTAM SODIUM AND PIPERACILLIN SODIUM SCH MLS/HR: 375; 3 INJECTION, SOLUTION INTRAVENOUS at 01:34

## 2018-08-09 RX ADMIN — INSULIN LISPRO SCH UNIT: 100 INJECTION, SOLUTION INTRAVENOUS; SUBCUTANEOUS at 16:30

## 2018-08-09 RX ADMIN — TAZOBACTAM SODIUM AND PIPERACILLIN SODIUM SCH MLS/HR: 375; 3 INJECTION, SOLUTION INTRAVENOUS at 06:00

## 2018-08-09 RX ADMIN — DEXAMETHASONE SCH MG: 4 TABLET ORAL at 11:55

## 2018-08-09 RX ADMIN — DEXAMETHASONE SCH MG: 4 TABLET ORAL at 17:55

## 2018-08-09 RX ADMIN — METOPROLOL TARTRATE SCH MG: 25 TABLET, FILM COATED ORAL at 17:00

## 2018-08-09 RX ADMIN — Medication SCH MG: at 08:15

## 2018-08-09 RX ADMIN — Medication SCH MG: at 17:00

## 2018-08-09 RX ADMIN — ENALAPRIL MALEATE SCH MG: 5 TABLET ORAL at 08:15

## 2018-08-09 RX ADMIN — ENALAPRIL MALEATE SCH MG: 5 TABLET ORAL at 17:00

## 2018-08-09 NOTE — PROGRESS NOTE
DATE:  August 09, 2018 



SUBJECTIVE:  The patient was seen and examined today.  The patient appeared 

better.  Clinically doing better.  No worsening event noted. 



EXAMINATION

GENERAL:  Alert, awake, communicative.

HEENT:  Normocephalic, atraumatic.  Sclerae pink.  Conjunctivae clear.

NECK:  Supple. 

CHEST:  Decreased breath sounds on bases.

CARDIOVASCULAR:  Regular rate and rhythm. 

ABDOMEN:  Soft.  

EXTREMITIES:  No edema.

SKIN:  The patient has right facial swelling with left eyelid drop.



LABS AND IMAGING:  Reviewed.



ASSESSMENT AND PLAN:  The patient with history of multiple medical 

conditions including sinus tumor, orbital cellulitis.  Admitted with 

pancytopenia, hyperglycemia.  Clinical condition is improving.  Pain is 

better controlled with methadone.  The patient will continue on 

dexamethasone.  Continue current care.  Will monitor the patient closely.  

The patient can be discharged and followed as outpatient. 









DD:  08/09/2018 08:53

DT:  08/09/2018 08:58

Job#:  [id number]

## 2018-08-10 VITALS — DIASTOLIC BLOOD PRESSURE: 88 MMHG | SYSTOLIC BLOOD PRESSURE: 142 MMHG

## 2018-08-10 VITALS — SYSTOLIC BLOOD PRESSURE: 157 MMHG | DIASTOLIC BLOOD PRESSURE: 108 MMHG

## 2018-08-10 VITALS — DIASTOLIC BLOOD PRESSURE: 95 MMHG | SYSTOLIC BLOOD PRESSURE: 162 MMHG

## 2018-08-10 VITALS — SYSTOLIC BLOOD PRESSURE: 160 MMHG | DIASTOLIC BLOOD PRESSURE: 99 MMHG

## 2018-08-10 VITALS — DIASTOLIC BLOOD PRESSURE: 99 MMHG | SYSTOLIC BLOOD PRESSURE: 160 MMHG

## 2018-08-10 LAB
ANION GAP SERPL CALC-SCNC: 11.9 MMOL/L (ref 8–16)
BASOPHILS # BLD AUTO: 0 10*3/UL (ref 0–0.1)
BASOPHILS NFR BLD AUTO: 0.2 % (ref 0–1)
BUN SERPL-MCNC: 13 MG/DL (ref 7–26)
BUN/CREAT SERPL: 13 (ref 6–25)
CALCIUM SERPL-MCNC: 9.5 MG/DL (ref 8.4–10.2)
CHLORIDE SERPL-SCNC: 105 MMOL/L (ref 98–107)
CO2 SERPL-SCNC: 27 MMOL/L (ref 22–29)
DEPRECATED NEUTROPHILS # BLD AUTO: 6.7 10*3/UL (ref 2.1–6.9)
EGFRCR SERPLBLD CKD-EPI 2021: > 60 ML/MIN (ref 60–?)
EOSINOPHIL # BLD AUTO: 0 10*3/UL (ref 0–0.4)
EOSINOPHIL NFR BLD AUTO: 0.1 % (ref 0–6)
ERYTHROCYTE [DISTWIDTH] IN CORD BLOOD: 14.6 % (ref 11.7–14.4)
GLUCOSE SERPLBLD-MCNC: 118 MG/DL (ref 74–118)
HCT VFR BLD AUTO: 33.5 % (ref 38.2–49.6)
HGB BLD-MCNC: 11.7 G/DL (ref 14–18)
LYMPHOCYTES # BLD: 0.6 10*3/UL (ref 1–3.2)
LYMPHOCYTES NFR BLD AUTO: 7.4 % (ref 18–39.1)
MAGNESIUM SERPL-MCNC: 1.9 MG/DL (ref 1.3–2.1)
MCH RBC QN AUTO: 34.5 PG (ref 28–32)
MCHC RBC AUTO-ENTMCNC: 34.9 G/DL (ref 31–35)
MCV RBC AUTO: 98.8 FL (ref 81–99)
MONOCYTES # BLD AUTO: 0.8 10*3/UL (ref 0.2–0.8)
MONOCYTES NFR BLD AUTO: 9.6 % (ref 4.4–11.3)
NEUTS SEG NFR BLD AUTO: 81.5 % (ref 38.7–80)
PLATELET # BLD AUTO: 139 X10E3/UL (ref 140–360)
POTASSIUM SERPL-SCNC: 3.9 MMOL/L (ref 3.5–5.1)
RBC # BLD AUTO: 3.39 X10E6/UL (ref 4.3–5.7)
SODIUM SERPL-SCNC: 140 MMOL/L (ref 136–145)

## 2018-08-10 RX ADMIN — GENTAMICIN SULFATE SCH ML: 3 SOLUTION OPHTHALMIC at 05:29

## 2018-08-10 RX ADMIN — FAMOTIDINE SCH MG: 20 TABLET, FILM COATED ORAL at 08:01

## 2018-08-10 RX ADMIN — INSULIN LISPRO SCH UNIT: 100 INJECTION, SOLUTION INTRAVENOUS; SUBCUTANEOUS at 07:30

## 2018-08-10 RX ADMIN — Medication SCH MG: at 09:31

## 2018-08-10 RX ADMIN — DEXAMETHASONE SCH MG: 4 TABLET ORAL at 06:04

## 2018-08-10 RX ADMIN — METOPROLOL TARTRATE SCH MG: 25 TABLET, FILM COATED ORAL at 09:31

## 2018-08-10 RX ADMIN — ENALAPRIL MALEATE SCH MG: 5 TABLET ORAL at 09:32

## 2018-08-10 RX ADMIN — DEXAMETHASONE SCH MG: 4 TABLET ORAL at 12:28

## 2018-08-10 RX ADMIN — INSULIN LISPRO SCH UNIT: 100 INJECTION, SOLUTION INTRAVENOUS; SUBCUTANEOUS at 11:30

## 2018-08-10 RX ADMIN — DEXAMETHASONE SCH MG: 4 TABLET ORAL at 00:35

## 2018-08-10 NOTE — PROGRESS NOTE
DATE:  August 10, 2018 



SUBJECTIVE:  The patient was seen and examined today.  The patient appeared 

comfortable.  Clinical condition has been improving.  No worsening event 

noted. 



EXAMINATION

GENERAL:  Alert, awake, communicative.

HEENT:  Normocephalic, atraumatic.  The patient's left eye was with partial 

ptosis.  Left facial swelling. 

NECK:  Supple.  No JVD.

CHEST:  Clear to auscultation.

ABDOMEN:  Soft, nontender.  

EXTREMITIES:  No edema.



LABS AND IMAGING:  Reviewed.



ASSESSMENT AND PLAN:  The patient with history of sinus tumor, status post 

sinus surgery, radiation treatment, admitted with left-sided facial 

cellulitis with hyperglycemia and confusion.  Clinical condition is 

improving.  The patient likely scheduled for discharge today.  At this 

point, recommend follow up with MD Fox as an outpatient.  Do not 

recommend any hematological intervention at current.  Will monitor the 

patient very closely.









DD:  08/10/2018 08:44

DT:  08/10/2018 08:53

Job#:  [id number]

## 2018-08-10 NOTE — DISCHARGE SUMMARY
ADMISSION DIAGNOSES

1.  Left periorbital cellulitis.

2.  Uncontrolled type 2 diabetes.

3.  Hypertension.

4.  Stage IV sinus cancer.

5.  Low thyroid-stimulating hormone.

6.  Tachycardia.

7.  Urinary tract infection.

8.  Hyponatremia.

9.  Obesity.



DISCHARGE DIAGNOSES

1.  Left periorbital cellulitis.

2.  Uncontrolled type 2 diabetes.

3.  Hypertension.

4.  Stage IV sinus cancer.

5.  Low thyroid-stimulating hormone.

6.  Tachycardia.

7.  Urinary tract infection.

8.  Hyponatremia.

9.  Obesity.

10.  Rule out urinary tract infection.

11.  Calcified lesion of the anterior skull base and right temporal lobe 

with surrounding edema.



HISTORY:  Patient has a history of hypertension, type 2 diabetes, stage IV 

sinus cancer with resection, radiation and chemo.  Surgical history of 

bilateral ankle fracture repair, sinus surgery for resection of the cancer, 

and a left Port-A-Cath.



HOSPITAL COURSE:  A 48-year-old male complains of feeling sedated after 

taking methadone prescribed by his oncologist on Tuesday.  He took this 

pill and woke up feeling strange.  He says the sugars are high because he 

does not always take his insulin as prescribed.  He says he ran out early 

and did not get another prescription.  He also complains of left eye pain 

and drainage.  He is unable to see out of his left eye and has no 

extraocular movement in that eye.  He also complains of generalized 

weakness.



On admission, ID was consulted and patient started on vancomycin and Zosyn. 

 Patient was started on glipizide and sliding-scale insulin, but his sugars 

were unable to be controlled and so he was switched to an insulin drip per 

endo.  Hematology/oncology consulted for the sinus cancer.  Dietary 

restrictions for his obesity.  The left periorbital cellulitis continued to 

improve over the next few days in the hospital.  Per hematology/oncology 

_______, patient was taken off of his steroid as he said he did not need 

them.  He could follow up outpatient with his oncologist if they chose to 

renew them or not.  Over the next couple of days following that, the 

patient began to become more confused and lethargic.



On admission, patient had a CAT scan that showed no acute abnormalities.  

CAT scan was of the sinuses and head.  Maxillofacial CT showed left 

periorbital swelling that may reflect cellulitis.  No other changes from 

previous CAT scan.  The head CT showed no acute intracranial abnormalities. 

 Unchanged calcified lesions along the anterior skull base and right 

temporal lobe with surrounding edema and local extension of the tumor to 

the dura along that anteroinferior left frontal and left temporal 

convexities.  Mild ventriculomegaly.  No hydrocephalus.



Chest x-ray on admission showed low lung volumes with bibasilar 

atelectasis.  No effusion, no pneumothorax.



Patient was placed on a low-dose beta blocker due to tachycardia.



EKG and telemetry showed occasional PVCs and PACs.  Patient was also 

initially placed on amiodarone for a few days per cardiology.  At time of 

discharge, patient does not need amiodarone.  He will discharge only with 

metoprolol.



Patient appeared to then develop red man syndrome due to vancomycin; so, IV 

antibiotics were discontinued per ID.  Patient's white count remained 

normal.  Neurology was then consulted due to increasing lethargy.  Per 

neurology, patient was placed back on steroids, initially IV and then p.o. 

as well as Dilantin for possible seizures.  No seizures were noted during 

hospitalization.



Patient was then moved to ICU, had an NG tube placed and was intubated.  

Modified barium swallow showed no penetration or aspiration.  Patient had 4 

blood cultures which all came up negative.  Urine culture was negative.



After being on the steroids for couple more days, patient made steady 

improvement.  He is alert and oriented again, ambulating well with PT with 

minimal assist.  Patient requested to move to [facility], but upon 

speaking with the doctor in [facility], there are no rooms available 

and he will have to follow up outpatient.  Patient and family understand 

discharge instructions and agree to plan.  He will discharge home with

1.  Dexamethasone 4 mg q.6 h.

2.  Enalapril 5 b.i.d.

3.  Gentamicin eye drops q.8 h.

4.  Metoprolol 25 b.i.d.

5.  Dilantin 300 nightly.

6.  Home medicines of Zofran and Norvasc.



He will follow up with endocrinology as needed and [facility] as 

discussed.



Vital signs stable.  Patient afebrile.  He is ready and excited to go home.



Dictated by Krysten Beaulieu NP







 _________________________________

MIKA CARLTON MD



DD:  08/10/2018 17:33

DT:  08/10/2018 22:53

Job#:  [id number]